# Patient Record
Sex: MALE | Race: WHITE
[De-identification: names, ages, dates, MRNs, and addresses within clinical notes are randomized per-mention and may not be internally consistent; named-entity substitution may affect disease eponyms.]

---

## 2019-06-21 ENCOUNTER — HOSPITAL ENCOUNTER (OUTPATIENT)
Dept: HOSPITAL 61 - CT | Age: 44
Discharge: HOME | End: 2019-06-21
Attending: INTERNAL MEDICINE
Payer: COMMERCIAL

## 2019-06-21 DIAGNOSIS — R91.1: ICD-10-CM

## 2019-06-21 DIAGNOSIS — K76.89: ICD-10-CM

## 2019-06-21 DIAGNOSIS — K63.89: ICD-10-CM

## 2019-06-21 DIAGNOSIS — C18.9: Primary | ICD-10-CM

## 2019-06-21 PROCEDURE — 71260 CT THORAX DX C+: CPT

## 2019-06-21 PROCEDURE — 74177 CT ABD & PELVIS W/CONTRAST: CPT

## 2019-06-21 NOTE — RAD
CT of the chest, abdomen and pelvis with contrast, 6/21/2019:

 

HISTORY: Sigmoid colon mass, colon cancer

 

Multidetector CT imaging was performed following oral and IV 

administration of contrast.

 

No pulmonary mass or consolidation is seen. There is no evidence of 

pleural fluid. No mediastinal or hilar adenopathy is evident.

 

There is a poorly marginated 2.4 cm low-density lesion in the superior 

aspect of the right lobe of the liver. A similar lesion is present 

anteriorly in the left lobe. There is a very tiny additional lesion seen 

posteriorly in the dome of the right lobe of the liver. No bile duct 

dilatation is seen. The gallbladder is unremarkable.

 

The pancreas shows no abnormality. The spleen is of normal size. No renal 

or adrenal abnormality is detected. No bladder abnormality is evident.

 

The abdominal aorta is unremarkable. No retroperitoneal or pelvic 

adenopathy is seen.

 

The oral contrast material has reached the right colon. There is moderate 

mural thickening involving the mid sigmoid colon, presumably representing 

the mass seen at colonoscopy. There is minimal streaky increased density 

in the paracolic fat. 

A cluster of small nearby mesenteric lymph nodes are seen left of midline,

both of which measure approximately 6-7 mm. No definite pathologically 

enlarged mesenteric lymph nodes are seen. No free fluid or free air is 

evident in the abdomen or pelvis.

 

IMPRESSION:

1. Moderate mid sigmoid mural thickening suggesting primary colon 

malignancy.

2. Minimal adjacent streaky mesenteric densities are compatible with 

inflammation. Two nearby mesenteric lymph nodes of borderline size may be 

reactive or metastatic in nature.

3. Several ill-defined hepatic lesions are present raising the possibility

of hepatic metastatic disease.

4. No CT evidence of metastatic disease in the chest.

 

 

PQRS Compliance Statement:

 

One or more of the following individualized dose reduction techniques were

utilized for this examination:

1. Automated exposure control

2. Adjustment of the mA and/or kV according to patient size

3. Use of iterative reconstruction technique

 

 

 

Electronically signed by: Rick Moritz, MD (6/21/2019 2:30 PM) [facility]

## 2019-07-10 ENCOUNTER — HOSPITAL ENCOUNTER (OUTPATIENT)
Dept: HOSPITAL 61 - SURGPAT | Age: 44
Discharge: HOME | End: 2019-07-10
Attending: SURGERY
Payer: COMMERCIAL

## 2019-07-10 DIAGNOSIS — Z01.818: Primary | ICD-10-CM

## 2019-07-10 DIAGNOSIS — C18.7: ICD-10-CM

## 2019-07-10 LAB
ALBUMIN SERPL-MCNC: 3.5 G/DL (ref 3.4–5)
ALBUMIN/GLOB SERPL: 0.9 {RATIO} (ref 1–1.7)
ALP SERPL-CCNC: 65 U/L (ref 46–116)
ALT SERPL-CCNC: 28 U/L (ref 16–63)
ANION GAP SERPL CALC-SCNC: 10 MMOL/L (ref 6–14)
AST SERPL-CCNC: 16 U/L (ref 15–37)
BASOPHILS # BLD AUTO: 0.1 X10^3/UL (ref 0–0.2)
BASOPHILS NFR BLD: 2 % (ref 0–3)
BILIRUB SERPL-MCNC: 0.7 MG/DL (ref 0.2–1)
BUN SERPL-MCNC: 9 MG/DL (ref 8–26)
BUN/CREAT SERPL: 9 (ref 6–20)
CALCIUM SERPL-MCNC: 9 MG/DL (ref 8.5–10.1)
CHLORIDE SERPL-SCNC: 103 MMOL/L (ref 98–107)
CO2 SERPL-SCNC: 26 MMOL/L (ref 21–32)
CREAT SERPL-MCNC: 1 MG/DL (ref 0.7–1.3)
EOSINOPHIL NFR BLD: 0.7 X10^3/UL (ref 0–0.7)
EOSINOPHIL NFR BLD: 10 % (ref 0–3)
ERYTHROCYTE [DISTWIDTH] IN BLOOD BY AUTOMATED COUNT: 14 % (ref 11.5–14.5)
GFR SERPLBLD BASED ON 1.73 SQ M-ARVRAT: 81.2 ML/MIN
GLOBULIN SER-MCNC: 4 G/DL (ref 2.2–3.8)
GLUCOSE SERPL-MCNC: 83 MG/DL (ref 70–99)
HCT VFR BLD CALC: 41.1 % (ref 39–53)
HGB BLD-MCNC: 13.5 G/DL (ref 13–17.5)
LYMPHOCYTES # BLD: 1.7 X10^3/UL (ref 1–4.8)
LYMPHOCYTES NFR BLD AUTO: 25 % (ref 24–48)
MCH RBC QN AUTO: 26 PG (ref 25–35)
MCHC RBC AUTO-ENTMCNC: 33 G/DL (ref 31–37)
MCV RBC AUTO: 79 FL (ref 79–100)
MONO #: 0.7 X10^3/UL (ref 0–1.1)
MONOCYTES NFR BLD: 9 % (ref 0–9)
NEUT #: 3.9 X10^3/UL (ref 1.8–7.7)
NEUTROPHILS NFR BLD AUTO: 55 % (ref 31–73)
PLATELET # BLD AUTO: 285 X10^3/UL (ref 140–400)
POTASSIUM SERPL-SCNC: 3.7 MMOL/L (ref 3.5–5.1)
PROT SERPL-MCNC: 7.5 G/DL (ref 6.4–8.2)
RBC # BLD AUTO: 5.18 X10^6/UL (ref 4.3–5.7)
SODIUM SERPL-SCNC: 139 MMOL/L (ref 136–145)
WBC # BLD AUTO: 7.1 X10^3/UL (ref 4–11)

## 2019-07-10 PROCEDURE — 85025 COMPLETE CBC W/AUTO DIFF WBC: CPT

## 2019-07-10 PROCEDURE — 80053 COMPREHEN METABOLIC PANEL: CPT

## 2019-07-10 PROCEDURE — 36415 COLL VENOUS BLD VENIPUNCTURE: CPT

## 2019-07-10 PROCEDURE — 93005 ELECTROCARDIOGRAM TRACING: CPT

## 2019-07-10 PROCEDURE — 82378 CARCINOEMBRYONIC ANTIGEN: CPT

## 2019-07-10 PROCEDURE — 71046 X-RAY EXAM CHEST 2 VIEWS: CPT

## 2019-07-10 NOTE — RAD
AP and Lateral Views of the Chest  7/10/2019 12:47 PM

 

Indication: Preoperative

 

Comparison: CT of the chest, abdomen and pelvis June 21, 2019

 

Findings: There is no focal consolidation or infiltrate identified. The 

cardiomediastinal silhouette is within normal limits. There is no evidence

of pneumothorax or pleural effusion. No acute osseous abnormalities are 

identified.  

 

Impression: No evidence of acute cardiopulmonary process. 

 

Electronically signed by: Harry Fernando MD (7/10/2019 4:33 PM) [id number]

## 2019-07-10 NOTE — EKG
[facility]

              [address] [place] [address]

Test Date:    2019-07-10               Test Time:    12:35:36

Pat Name:     JEREMIAH CARRELL         Department:   

Patient ID:   [id number]           Room:          

Gender:       M                        Technician:   GUZMAN

:          1975               Requested By: DOLLY CHRISTENSEN

Order Number: [id number]           Reading MD:   Richard Rodriguez MD

                                 Measurements

Intervals                              Axis          

Rate:         75                       P:            0

WV:           128                      QRS:          36

QRSD:         72                       T:            18

QT:           362                                    

QTc:          407                                    

                           Interpretive Statements

SINUS RHYTHM



Electronically Signed On 2019 12:55:57 CDT by Richard Rodriguez MD

## 2019-07-16 ENCOUNTER — HOSPITAL ENCOUNTER (INPATIENT)
Dept: HOSPITAL 61 - OPSVCIP | Age: 44
LOS: 4 days | Discharge: HOME | DRG: 330 | End: 2019-07-20
Attending: SURGERY | Admitting: SURGERY
Payer: COMMERCIAL

## 2019-07-16 VITALS — DIASTOLIC BLOOD PRESSURE: 60 MMHG | SYSTOLIC BLOOD PRESSURE: 122 MMHG

## 2019-07-16 VITALS — SYSTOLIC BLOOD PRESSURE: 116 MMHG | DIASTOLIC BLOOD PRESSURE: 65 MMHG

## 2019-07-16 VITALS — SYSTOLIC BLOOD PRESSURE: 123 MMHG | DIASTOLIC BLOOD PRESSURE: 75 MMHG

## 2019-07-16 VITALS — SYSTOLIC BLOOD PRESSURE: 132 MMHG | DIASTOLIC BLOOD PRESSURE: 73 MMHG

## 2019-07-16 VITALS — DIASTOLIC BLOOD PRESSURE: 80 MMHG | SYSTOLIC BLOOD PRESSURE: 134 MMHG

## 2019-07-16 VITALS — WEIGHT: 238.1 LBS | HEIGHT: 75 IN | BODY MASS INDEX: 29.6 KG/M2

## 2019-07-16 VITALS — SYSTOLIC BLOOD PRESSURE: 130 MMHG | DIASTOLIC BLOOD PRESSURE: 60 MMHG

## 2019-07-16 VITALS — DIASTOLIC BLOOD PRESSURE: 66 MMHG | SYSTOLIC BLOOD PRESSURE: 117 MMHG

## 2019-07-16 VITALS — SYSTOLIC BLOOD PRESSURE: 128 MMHG | DIASTOLIC BLOOD PRESSURE: 73 MMHG

## 2019-07-16 VITALS — DIASTOLIC BLOOD PRESSURE: 75 MMHG | SYSTOLIC BLOOD PRESSURE: 124 MMHG

## 2019-07-16 VITALS — DIASTOLIC BLOOD PRESSURE: 62 MMHG | SYSTOLIC BLOOD PRESSURE: 117 MMHG

## 2019-07-16 VITALS — SYSTOLIC BLOOD PRESSURE: 121 MMHG | DIASTOLIC BLOOD PRESSURE: 46 MMHG

## 2019-07-16 DIAGNOSIS — Z82.49: ICD-10-CM

## 2019-07-16 DIAGNOSIS — E87.6: ICD-10-CM

## 2019-07-16 DIAGNOSIS — Z79.899: ICD-10-CM

## 2019-07-16 DIAGNOSIS — C18.7: Primary | ICD-10-CM

## 2019-07-16 DIAGNOSIS — C78.7: ICD-10-CM

## 2019-07-16 DIAGNOSIS — Z85.038: ICD-10-CM

## 2019-07-16 PROCEDURE — 88309 TISSUE EXAM BY PATHOLOGIST: CPT

## 2019-07-16 PROCEDURE — C2617 STENT, NON-COR, TEM W/O DEL: HCPCS

## 2019-07-16 PROCEDURE — A4461 SURGICL DRESS HOLD NON-REUSE: HCPCS

## 2019-07-16 PROCEDURE — 36415 COLL VENOUS BLD VENIPUNCTURE: CPT

## 2019-07-16 PROCEDURE — 88331 PATH CONSLTJ SURG 1 BLK 1SPC: CPT

## 2019-07-16 PROCEDURE — 0T788ZZ DILATION OF BILATERAL URETERS, VIA NATURAL OR ARTIFICIAL OPENING ENDOSCOPIC: ICD-10-PCS | Performed by: UROLOGY

## 2019-07-16 PROCEDURE — 74018 RADEX ABDOMEN 1 VIEW: CPT

## 2019-07-16 PROCEDURE — A7015 AEROSOL MASK USED W NEBULIZE: HCPCS

## 2019-07-16 PROCEDURE — 88305 TISSUE EXAM BY PATHOLOGIST: CPT

## 2019-07-16 PROCEDURE — 0DTN4ZZ RESECTION OF SIGMOID COLON, PERCUTANEOUS ENDOSCOPIC APPROACH: ICD-10-PCS | Performed by: SURGERY

## 2019-07-16 PROCEDURE — 85025 COMPLETE CBC W/AUTO DIFF WBC: CPT

## 2019-07-16 PROCEDURE — 80048 BASIC METABOLIC PNL TOTAL CA: CPT

## 2019-07-16 PROCEDURE — 0FB24ZX EXCISION OF LEFT LOBE LIVER, PERCUTANEOUS ENDOSCOPIC APPROACH, DIAGNOSTIC: ICD-10-PCS | Performed by: SURGERY

## 2019-07-16 PROCEDURE — C1769 GUIDE WIRE: HCPCS

## 2019-07-16 PROCEDURE — 0DJD8ZZ INSPECTION OF LOWER INTESTINAL TRACT, VIA NATURAL OR ARTIFICIAL OPENING ENDOSCOPIC: ICD-10-PCS | Performed by: SURGERY

## 2019-07-16 RX ADMIN — SODIUM CHLORIDE, SODIUM LACTATE, POTASSIUM CHLORIDE, AND CALCIUM CHLORIDE SCH MLS/HR: .6; .31; .03; .02 INJECTION, SOLUTION INTRAVENOUS at 12:30

## 2019-07-16 RX ADMIN — CEFOXITIN SCH GM: 2 INJECTION, POWDER, FOR SOLUTION INTRAVENOUS at 08:32

## 2019-07-16 RX ADMIN — SODIUM CHLORIDE, SODIUM LACTATE, POTASSIUM CHLORIDE, AND CALCIUM CHLORIDE SCH MLS/HR: .6; .31; .03; .02 INJECTION, SOLUTION INTRAVENOUS at 11:33

## 2019-07-16 RX ADMIN — SODIUM CHLORIDE, SODIUM LACTATE, POTASSIUM CHLORIDE, AND CALCIUM CHLORIDE SCH MLS/HR: .6; .31; .03; .02 INJECTION, SOLUTION INTRAVENOUS at 07:11

## 2019-07-16 RX ADMIN — CEFOXITIN SCH GM: 2 INJECTION, POWDER, FOR SOLUTION INTRAVENOUS at 08:30

## 2019-07-16 RX ADMIN — MORPHINE SULFATE PRN MLS/HR: 1 INJECTION INTRAVENOUS at 11:58

## 2019-07-16 RX ADMIN — SODIUM CHLORIDE, SODIUM LACTATE, POTASSIUM CHLORIDE, AND CALCIUM CHLORIDE SCH MLS/HR: .6; .31; .03; .02 INJECTION, SOLUTION INTRAVENOUS at 18:22

## 2019-07-16 RX ADMIN — ENOXAPARIN SODIUM SCH MG: 40 INJECTION SUBCUTANEOUS at 22:12

## 2019-07-16 NOTE — PDOC
SURGICAL PROGRESS NOTE


Subjective


43 yo M with sigmoid colon cancer (diagnosed by colonoscopy and biopsy).  Also 

noted to have masses in liver by CT, negative chest.


TO OR for laparoscopic versus open sigmoid resection, liver biopsy


R/R/B/A d/w pt and pt's family.  Risks, including, but not limited to:  

bleeding, infection, damage to surrounding structures, risk of anesthesia, risk 

of open, risk of anastomotic leak, risk of death.


They appear to understand, their questions are answered and they elect to 

proceed.


Office note H&P reviewed and unchanged.  CEA level 0.9 (seems unlikely to have 

metastatic spread to liver).


Vital Signs





Vital Signs








  Date Time  Temp Pulse Resp B/P (MAP) Pulse Ox O2 Delivery O2 Flow Rate FiO2


 


7/16/19 06:55 98 95 18 125/77 97 Room Air  





 98.0       

















DOLLY CHRISTENSEN MD             Jul 16, 2019 07:50

## 2019-07-16 NOTE — RAD
Indication: Postop sigmoid surgery

 

TECHNIQUE: Single supine AP view of the abdomen and pelvis

 

COMPARISON: None

 

FINDINGS:

The colon is distended with gas. No abnormally dilated small bowel loops 

noted. Visualized bones are within normal limits. No radiopaque foreign 

body. Ill-defined lucencies are seen in the pelvis.

 

IMPRESSION:

Gas-filled colon.

Ill-defined lucencies in the pelvis may represent emphysema if there is 

history of recent surgery.

 

Electronically signed by: Carlos Henriquez DO (7/16/2019 11:38 AM) [facility]

## 2019-07-16 NOTE — PDOC4
OPERATIVE NOTE


Date:


Date:  Jul 16, 2019





Pre-Op Diagnosis:


Colon cancer of the sigmoid colon





Post-Op Diagnosis:


same, metastasis to liver





Procedure Performed:


laparoscopic sigmoid colectomy, liver biopsy





Surgeon:


Odin Christensen





Anesthesia Type:


GETA plus local





Blood Loss:


50





Specimans Obtained:


sigmoid colon, liver biopsy





Findings:


left lobe of the liver with whitish mass, c/w metastatic adenocarcinoma by 

frozen section, moderate size mass in low sigmoid colon, no obvious metastatic 

disease anywhere else





Complications:


none





Operative Note:


After obtaining informed consent, patient was taken to OR, induced under GETA 

and prepped in the usual fashion in a low lithotomy position.  Ureteral stents 

placed prior to.  5 mm ports placed RLQ, suprapubic and epigastric, all under 

laparoscopic guidance.  Abdominal cavity explored.  Right, transverse colon 

normal in appearance.  Gallbladder grossly normal.  Right liver with obvious 

mass.  Left lobe of liver with 2 cm whitish mass.  This was biopsy using 

ligasure device.  Minimal bleeding.  Pathology demonstrated this to be c/w 

adenocarcinoma by frozen.  No other peritoneal pathology identified.  Small 

bowel normal in appearance.





Attention turned to sigmoid colon.  White line of Toldt divided with ligasure up

to splenic flexure.  Sigmoid colon fully mobilized.  Left ureter identified by 

palpation of stent and maintained without injury.  Mass noted in low sigmoid 

colon, superior to peritoneal reflexion.  Tattoo noted proximal and distal.  

Transverse incision made in LLQ and wound retractor placed with evisceration of 

sigmoid colon.  Colon divided proximal and distal to area using ELI stapler.  

Mesentery taken down to origin of vessels using ligasure.  Sigmoid vessels 

ligated with 3 0 vicryl stick tie and ligasure.  Specimen sent to pathology with

stitch in distal end.  Pathology demonstrated mass with good margins.





End to end sutured anastomosis created with 3 0 PDS inside and 3 0 vicryl 

outside.  Anastomosis noted to be patent, under no tension, completely viable 

and without evidence of leakage.  Bowel returned to abdominal cavity.  Copious 

irrigation.  No evidence of bleeding or other pathology.  Peritoneum closed with

0 vicryl.  Anterior fascia closed with 0 looped PDS.  Pneumoperitoneum 

reestablished and abdomen explored.  Copious irrigation.  No evidence of 

bleeding or other pathology.  Rigid sigmoidoscopy performed and no air bubbles 

detected from anastomosis and air passed readily proximal to area of concern.  

Ports removed without bleeding.  Skin repaired with 3 0 vicryl and 4 0 monocryl.

 Dressing placed.





Patient tolerated procedure well and sent to PACU in stable condition.  All 

counts correct.  Wound class is 3.











DOLLY CHRISTENSEN MD             Jul 16, 2019 11:52

## 2019-07-16 NOTE — PDOC4
OPERATIVE NOTE


Date:


Date:  Jul 16, 2019





Post-Op Diagnosis:


nl bladder;  hx colon ca





Procedure Performed:


cysto, pl ureteral caths'





Surgeon:


Kyung





Anesthesia Type:


Gen





Blood Loss:


min





Specimans Obtained:


neg





Findings:


nl bladder





Complications:


neg





Operative Note:


Under GA and in lith'y pos'n, IV abx admin'd





Cysto with 21 Scope was unrem.  UO's in nl pos'n.  5-Fr whstle tipped catheters 

were then threaded thru each ureter without diff.   The scope was removed prema  

tenorio was placed.  The ureteral cath's were then connected to the tenorio in usual

fashion.  A tenorio bag was the attached.   There were no complications.











NATI ESTES MD              Jul 16, 2019 08:44

## 2019-07-17 VITALS — DIASTOLIC BLOOD PRESSURE: 80 MMHG | SYSTOLIC BLOOD PRESSURE: 158 MMHG

## 2019-07-17 VITALS — SYSTOLIC BLOOD PRESSURE: 108 MMHG | DIASTOLIC BLOOD PRESSURE: 66 MMHG

## 2019-07-17 VITALS — DIASTOLIC BLOOD PRESSURE: 61 MMHG | SYSTOLIC BLOOD PRESSURE: 116 MMHG

## 2019-07-17 VITALS — SYSTOLIC BLOOD PRESSURE: 146 MMHG | DIASTOLIC BLOOD PRESSURE: 100 MMHG

## 2019-07-17 VITALS — SYSTOLIC BLOOD PRESSURE: 139 MMHG | DIASTOLIC BLOOD PRESSURE: 77 MMHG

## 2019-07-17 VITALS — DIASTOLIC BLOOD PRESSURE: 76 MMHG | SYSTOLIC BLOOD PRESSURE: 135 MMHG

## 2019-07-17 LAB
ANION GAP SERPL CALC-SCNC: 8 MMOL/L (ref 6–14)
BASOPHILS # BLD AUTO: 0 X10^3/UL (ref 0–0.2)
BASOPHILS NFR BLD: 1 % (ref 0–3)
BUN SERPL-MCNC: 8 MG/DL (ref 8–26)
CALCIUM SERPL-MCNC: 8.6 MG/DL (ref 8.5–10.1)
CHLORIDE SERPL-SCNC: 105 MMOL/L (ref 98–107)
CO2 SERPL-SCNC: 29 MMOL/L (ref 21–32)
CREAT SERPL-MCNC: 1 MG/DL (ref 0.7–1.3)
EOSINOPHIL NFR BLD: 0 % (ref 0–3)
EOSINOPHIL NFR BLD: 0 X10^3/UL (ref 0–0.7)
ERYTHROCYTE [DISTWIDTH] IN BLOOD BY AUTOMATED COUNT: 14.4 % (ref 11.5–14.5)
GFR SERPLBLD BASED ON 1.73 SQ M-ARVRAT: 81.2 ML/MIN
GLUCOSE SERPL-MCNC: 99 MG/DL (ref 70–99)
HCT VFR BLD CALC: 36.2 % (ref 39–53)
HGB BLD-MCNC: 12.1 G/DL (ref 13–17.5)
LYMPHOCYTES # BLD: 1.7 X10^3/UL (ref 1–4.8)
LYMPHOCYTES NFR BLD AUTO: 17 % (ref 24–48)
MCH RBC QN AUTO: 27 PG (ref 25–35)
MCHC RBC AUTO-ENTMCNC: 34 G/DL (ref 31–37)
MCV RBC AUTO: 79 FL (ref 79–100)
MONO #: 1.1 X10^3/UL (ref 0–1.1)
MONOCYTES NFR BLD: 11 % (ref 0–9)
NEUT #: 6.9 X10^3/UL (ref 1.8–7.7)
NEUTROPHILS NFR BLD AUTO: 71 % (ref 31–73)
PLATELET # BLD AUTO: 240 X10^3/UL (ref 140–400)
POTASSIUM SERPL-SCNC: 3.9 MMOL/L (ref 3.5–5.1)
RBC # BLD AUTO: 4.58 X10^6/UL (ref 4.3–5.7)
SODIUM SERPL-SCNC: 142 MMOL/L (ref 136–145)
WBC # BLD AUTO: 9.7 X10^3/UL (ref 4–11)

## 2019-07-17 RX ADMIN — MORPHINE SULFATE PRN MLS/HR: 1 INJECTION INTRAVENOUS at 07:04

## 2019-07-17 RX ADMIN — MORPHINE SULFATE PRN MLS/HR: 1 INJECTION INTRAVENOUS at 21:04

## 2019-07-17 RX ADMIN — SODIUM CHLORIDE, SODIUM LACTATE, POTASSIUM CHLORIDE, AND CALCIUM CHLORIDE SCH MLS/HR: .6; .31; .03; .02 INJECTION, SOLUTION INTRAVENOUS at 09:55

## 2019-07-17 RX ADMIN — SODIUM CHLORIDE, SODIUM LACTATE, POTASSIUM CHLORIDE, AND CALCIUM CHLORIDE SCH MLS/HR: .6; .31; .03; .02 INJECTION, SOLUTION INTRAVENOUS at 17:28

## 2019-07-17 RX ADMIN — ENOXAPARIN SODIUM SCH MG: 40 INJECTION SUBCUTANEOUS at 22:19

## 2019-07-17 NOTE — PDOC
SURGICAL PROGRESS NOTE


Subjective


Pt doing well, has been ambulating, no N/V, passing some flatus


Vital Signs





Vital Signs








  Date Time  Temp Pulse Resp B/P (MAP) Pulse Ox O2 Delivery O2 Flow Rate FiO2


 


7/17/19 15:00 97.8 115 18 146/100 (115) 94 Room Air  





 97.8       


 


7/17/19 08:00       2.0 








I&O











Intake and Output 


 


 7/17/19





 07:00


 


Intake Total 4200 ml


 


Output Total 730 ml


 


Balance 3470 ml


 


 


 


Intake IV Total 3700 ml


 


Other 500 ml


 


Output Urine Total 680 ml


 


Estimated Blood Loss 50 ml








PATIENT HAS A FERREIRA:  Yes


General:  Alert, Oriented X3, Cooperative, No acute distress


Abdomen:  Soft, No tenderness


Labs





Laboratory Tests








Test


 7/17/19


07:40


 


White Blood Count


 9.7 x10^3/uL


(4.0-11.0)


 


Red Blood Count


 4.58 x10^6/uL


(4.30-5.70)


 


Hemoglobin


 12.1 g/dL


(13.0-17.5)


 


Hematocrit


 36.2 %


(39.0-53.0)


 


Mean Corpuscular Volume 79 fL () 


 


Mean Corpuscular Hemoglobin 27 pg (25-35) 


 


Mean Corpuscular Hemoglobin


Concent 34 g/dL


(31-37)


 


Red Cell Distribution Width


 14.4 %


(11.5-14.5)


 


Platelet Count


 240 x10^3/uL


(140-400)


 


Neutrophils (%) (Auto) 71 % (31-73) 


 


Lymphocytes (%) (Auto) 17 % (24-48) 


 


Monocytes (%) (Auto) 11 % (0-9) 


 


Eosinophils (%) (Auto) 0 % (0-3) 


 


Basophils (%) (Auto) 1 % (0-3) 


 


Neutrophils # (Auto)


 6.9 x10^3/uL


(1.8-7.7)


 


Lymphocytes # (Auto)


 1.7 x10^3/uL


(1.0-4.8)


 


Monocytes # (Auto)


 1.1 x10^3/uL


(0.0-1.1)


 


Eosinophils # (Auto)


 0.0 x10^3/uL


(0.0-0.7)


 


Basophils # (Auto)


 0.0 x10^3/uL


(0.0-0.2)


 


Sodium Level


 142 mmol/L


(136-145)


 


Potassium Level


 3.9 mmol/L


(3.5-5.1)


 


Chloride Level


 105 mmol/L


()


 


Carbon Dioxide Level


 29 mmol/L


(21-32)


 


Anion Gap 8 (6-14) 


 


Blood Urea Nitrogen 8 mg/dL (8-26) 


 


Creatinine


 1.0 mg/dL


(0.7-1.3)


 


Estimated GFR


(Cockcroft-Gault) 81.2 





 


Glucose Level


 99 mg/dL


(70-99)


 


Calcium Level


 8.6 mg/dL


(8.5-10.1)








Laboratory Tests








Test


 7/17/19


07:40


 


White Blood Count


 9.7 x10^3/uL


(4.0-11.0)


 


Red Blood Count


 4.58 x10^6/uL


(4.30-5.70)


 


Hemoglobin


 12.1 g/dL


(13.0-17.5)


 


Hematocrit


 36.2 %


(39.0-53.0)


 


Mean Corpuscular Volume 79 fL () 


 


Mean Corpuscular Hemoglobin 27 pg (25-35) 


 


Mean Corpuscular Hemoglobin


Concent 34 g/dL


(31-37)


 


Red Cell Distribution Width


 14.4 %


(11.5-14.5)


 


Platelet Count


 240 x10^3/uL


(140-400)


 


Neutrophils (%) (Auto) 71 % (31-73) 


 


Lymphocytes (%) (Auto) 17 % (24-48) 


 


Monocytes (%) (Auto) 11 % (0-9) 


 


Eosinophils (%) (Auto) 0 % (0-3) 


 


Basophils (%) (Auto) 1 % (0-3) 


 


Neutrophils # (Auto)


 6.9 x10^3/uL


(1.8-7.7)


 


Lymphocytes # (Auto)


 1.7 x10^3/uL


(1.0-4.8)


 


Monocytes # (Auto)


 1.1 x10^3/uL


(0.0-1.1)


 


Eosinophils # (Auto)


 0.0 x10^3/uL


(0.0-0.7)


 


Basophils # (Auto)


 0.0 x10^3/uL


(0.0-0.2)


 


Sodium Level


 142 mmol/L


(136-145)


 


Potassium Level


 3.9 mmol/L


(3.5-5.1)


 


Chloride Level


 105 mmol/L


()


 


Carbon Dioxide Level


 29 mmol/L


(21-32)


 


Anion Gap 8 (6-14) 


 


Blood Urea Nitrogen 8 mg/dL (8-26) 


 


Creatinine


 1.0 mg/dL


(0.7-1.3)


 


Estimated GFR


(Cockcroft-Gault) 81.2 





 


Glucose Level


 99 mg/dL


(70-99)


 


Calcium Level


 8.6 mg/dL


(8.5-10.1)








Problem List


s/p l/s left colon


monitor tachycardia, will ask hospitalist to evaluate HTN


suspect stress response from surgery


consider kayley in AM











DOLLY CHRISTENSEN MD             Jul 17, 2019 18:19

## 2019-07-18 VITALS — DIASTOLIC BLOOD PRESSURE: 69 MMHG | SYSTOLIC BLOOD PRESSURE: 124 MMHG

## 2019-07-18 VITALS — SYSTOLIC BLOOD PRESSURE: 141 MMHG | DIASTOLIC BLOOD PRESSURE: 78 MMHG

## 2019-07-18 VITALS — DIASTOLIC BLOOD PRESSURE: 79 MMHG | SYSTOLIC BLOOD PRESSURE: 130 MMHG

## 2019-07-18 VITALS — DIASTOLIC BLOOD PRESSURE: 67 MMHG | SYSTOLIC BLOOD PRESSURE: 139 MMHG

## 2019-07-18 VITALS — DIASTOLIC BLOOD PRESSURE: 77 MMHG | SYSTOLIC BLOOD PRESSURE: 132 MMHG

## 2019-07-18 VITALS — DIASTOLIC BLOOD PRESSURE: 75 MMHG | SYSTOLIC BLOOD PRESSURE: 124 MMHG

## 2019-07-18 RX ADMIN — SODIUM CHLORIDE, SODIUM LACTATE, POTASSIUM CHLORIDE, AND CALCIUM CHLORIDE SCH MLS/HR: .6; .31; .03; .02 INJECTION, SOLUTION INTRAVENOUS at 23:54

## 2019-07-18 RX ADMIN — MORPHINE SULFATE PRN MLS/HR: 1 INJECTION INTRAVENOUS at 14:56

## 2019-07-18 RX ADMIN — ENOXAPARIN SODIUM SCH MG: 40 INJECTION SUBCUTANEOUS at 22:27

## 2019-07-18 RX ADMIN — SODIUM CHLORIDE, SODIUM LACTATE, POTASSIUM CHLORIDE, AND CALCIUM CHLORIDE SCH MLS/HR: .6; .31; .03; .02 INJECTION, SOLUTION INTRAVENOUS at 16:33

## 2019-07-18 RX ADMIN — SODIUM CHLORIDE, SODIUM LACTATE, POTASSIUM CHLORIDE, AND CALCIUM CHLORIDE SCH MLS/HR: .6; .31; .03; .02 INJECTION, SOLUTION INTRAVENOUS at 07:56

## 2019-07-18 NOTE — PDOC
SURGICAL PROGRESS NOTE


Subjective


+ flatus


pain managed


up in chair


Vital Signs





Vital Signs








  Date Time  Temp Pulse Resp B/P (MAP) Pulse Ox O2 Delivery O2 Flow Rate FiO2


 


7/18/19 08:00      Room Air  


 


7/18/19 07:00 98.1 109 16 130/79 (96) 97   





 98.1       


 


7/17/19 08:00       2.0 








I&O











Intake and Output 


 


 7/18/19





 06:59


 


Intake Total 1500 ml


 


Output Total 3625 ml


 


Balance -2125 ml


 


 


 


Intake IV Total 1500 ml


 


Output Urine Total 3625 ml








General:  Alert, Oriented X3, Cooperative, No acute distress


Abdomen:  Soft, Other (incisions c/d/i, no erythema )


Labs





Laboratory Tests








Test


 7/17/19


07:40


 


White Blood Count


 9.7 x10^3/uL


(4.0-11.0)


 


Red Blood Count


 4.58 x10^6/uL


(4.30-5.70)


 


Hemoglobin


 12.1 g/dL


(13.0-17.5)


 


Hematocrit


 36.2 %


(39.0-53.0)


 


Mean Corpuscular Volume 79 fL () 


 


Mean Corpuscular Hemoglobin 27 pg (25-35) 


 


Mean Corpuscular Hemoglobin


Concent 34 g/dL


(31-37)


 


Red Cell Distribution Width


 14.4 %


(11.5-14.5)


 


Platelet Count


 240 x10^3/uL


(140-400)


 


Neutrophils (%) (Auto) 71 % (31-73) 


 


Lymphocytes (%) (Auto) 17 % (24-48) 


 


Monocytes (%) (Auto) 11 % (0-9) 


 


Eosinophils (%) (Auto) 0 % (0-3) 


 


Basophils (%) (Auto) 1 % (0-3) 


 


Neutrophils # (Auto)


 6.9 x10^3/uL


(1.8-7.7)


 


Lymphocytes # (Auto)


 1.7 x10^3/uL


(1.0-4.8)


 


Monocytes # (Auto)


 1.1 x10^3/uL


(0.0-1.1)


 


Eosinophils # (Auto)


 0.0 x10^3/uL


(0.0-0.7)


 


Basophils # (Auto)


 0.0 x10^3/uL


(0.0-0.2)


 


Sodium Level


 142 mmol/L


(136-145)


 


Potassium Level


 3.9 mmol/L


(3.5-5.1)


 


Chloride Level


 105 mmol/L


()


 


Carbon Dioxide Level


 29 mmol/L


(21-32)


 


Anion Gap 8 (6-14) 


 


Blood Urea Nitrogen 8 mg/dL (8-26) 


 


Creatinine


 1.0 mg/dL


(0.7-1.3)


 


Estimated GFR


(Cockcroft-Gault) 81.2 





 


Glucose Level


 99 mg/dL


(70-99)


 


Calcium Level


 8.6 mg/dL


(8.5-10.1)








Assessment/Plan


s/p resection


tachy improving


start MONTSERRAT Joya APRN            Jul 18, 2019 10:48

## 2019-07-18 NOTE — CONS
DATE OF CONSULTATION:  07/18/2019



CHIEF COMPLAINT:  Colon cancer status post sigmoid colon resection and

cystoscopy with ureteral stents.  Request for medical evaluation and treatment

of comorbidities.



HISTORY OF PRESENT ILLNESS:  The patient is a pleasant middle-aged male who was

diagnosed with colon cancer recently by colonoscopy with biopsies.  Yesterday,

he underwent sigmoid resection laparoscopically with liver biopsies and he also

had Urology do a cystoscopy with ureteral stent placements at the same time.  We

have been requested for postop medical evaluation and treatment of

comorbidities.



PAST MEDICAL HISTORY:  Constipation.



ALLERGIES:  None.



FAMILY HISTORY:  Hypertension.



SOCIAL HISTORY:  Does not drink, smoke or take drugs.



MEDICATIONS:  Reviewed.  He is on docusate and Metamucil.



REVIEW OF SYSTEMS:

GENERAL:  No history of weight change, weakness or fevers.

SKIN:  No bruising, hair changes or rashes.

EYES:  No blurred, double or loss of vision.

NOSE AND THROAT:  No history of nosebleeds, hoarseness or sore throat.

HEART:  No history of palpitations, chest pain or shortness of breath on

exertion.

LUNGS:  Denies cough, hemoptysis, wheezing or shortness of breath.

GASTROINTESTINAL:  Denies changes in appetite, nausea, vomiting, diarrhea or

constipation.

GENITOURINARY:  No history of frequency, urgency, hesitancy or nocturia.

NEUROLOGIC:  Denies history of numbness, tingling, tremor or weakness.

PSYCHIATRIC:  No history of panic, anxiety or depression.

ENDOCRINE:  No history of heat or cold intolerance, polyuria or polydipsia.

EXTREMITIES:  Denies muscle weakness, joint pain, pain on walking or stiffness.



PHYSICAL EXAMINATION:

VITALS:  Within normal limits and are stable.

GENERAL:  No apparent distress.  Alert and oriented.

HEENT:  Head is normocephalic, atraumatic, pupils were equally round and

reactive to light and accommodation.

NECK:  Supple, no JVD, no thyromegaly was noted.

LUNGS:  Clear to auscultation in all lung fields without rhonchi or wheezing.

HEART:  RRR, S1, S2 present.  Peripheral pulses intact, no obvious murmurs were

noted.

ABDOMEN:  Soft, nontender.  Positive bowel sounds no organomegaly, normal bowel

sounds.

EXTREMITIES:  Without any cyanosis, clubbing, or edema.  Pedal pulses intact,

Homans sign is negative.

NEUROLOGIC:  Normal speech, normal tone.  A and O x3, moves all extremities, no

obvious focal deficits.

PSYCHIATRIC:  Normal affect, normal mood.  Stable.

SKIN:  No ulcerations or rashes, good skin turgor, no jaundice.

VASCULAR:  Good capillary refill, neurovascular bundle appears to be intact.

LABORATORY DATA:  Hemoglobin is 12.1.  Electrolytes are normal.



ASSESSMENT AND PLAN:  Colon cancer status post sigmoid colon resection and

status post cystoscopy with ureteral stent placement.  The patient clinically

seems to be doing okay.  We will check a fresh set of labs in the morning. 

Aggressive wound care, PT, OT, home meds, p.r.n. narcotics and currently is on a

morphine pump and p.r.n. Zofran.



Thank you very much for allowing us to participate in the care of this nice

gentleman.

 



______________________________

CONNIE COPELAND DO



DR:  ALEJANDRO/micaela  JOB#:  [id number] / [id number]

DD:  07/18/2019 12:49  DT:  07/18/2019 20:54

## 2019-07-19 VITALS — SYSTOLIC BLOOD PRESSURE: 143 MMHG | DIASTOLIC BLOOD PRESSURE: 67 MMHG

## 2019-07-19 VITALS — DIASTOLIC BLOOD PRESSURE: 67 MMHG | SYSTOLIC BLOOD PRESSURE: 123 MMHG

## 2019-07-19 VITALS — SYSTOLIC BLOOD PRESSURE: 123 MMHG | DIASTOLIC BLOOD PRESSURE: 62 MMHG

## 2019-07-19 VITALS — SYSTOLIC BLOOD PRESSURE: 139 MMHG | DIASTOLIC BLOOD PRESSURE: 79 MMHG

## 2019-07-19 VITALS — SYSTOLIC BLOOD PRESSURE: 140 MMHG | DIASTOLIC BLOOD PRESSURE: 71 MMHG

## 2019-07-19 VITALS — DIASTOLIC BLOOD PRESSURE: 75 MMHG | SYSTOLIC BLOOD PRESSURE: 131 MMHG

## 2019-07-19 LAB
ANION GAP SERPL CALC-SCNC: 8 MMOL/L (ref 6–14)
BASOPHILS # BLD AUTO: 0.1 X10^3/UL (ref 0–0.2)
BASOPHILS NFR BLD: 1 % (ref 0–3)
BUN SERPL-MCNC: 7 MG/DL (ref 8–26)
CALCIUM SERPL-MCNC: 8.6 MG/DL (ref 8.5–10.1)
CHLORIDE SERPL-SCNC: 102 MMOL/L (ref 98–107)
CO2 SERPL-SCNC: 29 MMOL/L (ref 21–32)
CREAT SERPL-MCNC: 0.9 MG/DL (ref 0.7–1.3)
EOSINOPHIL NFR BLD: 0.1 X10^3/UL (ref 0–0.7)
EOSINOPHIL NFR BLD: 2 % (ref 0–3)
ERYTHROCYTE [DISTWIDTH] IN BLOOD BY AUTOMATED COUNT: 14.1 % (ref 11.5–14.5)
GFR SERPLBLD BASED ON 1.73 SQ M-ARVRAT: 91.7 ML/MIN
GLUCOSE SERPL-MCNC: 96 MG/DL (ref 70–99)
HCT VFR BLD CALC: 35 % (ref 39–53)
HGB BLD-MCNC: 11.5 G/DL (ref 13–17.5)
LYMPHOCYTES # BLD: 1.3 X10^3/UL (ref 1–4.8)
LYMPHOCYTES NFR BLD AUTO: 22 % (ref 24–48)
MCH RBC QN AUTO: 26 PG (ref 25–35)
MCHC RBC AUTO-ENTMCNC: 33 G/DL (ref 31–37)
MCV RBC AUTO: 79 FL (ref 79–100)
MONO #: 0.7 X10^3/UL (ref 0–1.1)
MONOCYTES NFR BLD: 13 % (ref 0–9)
NEUT #: 3.6 X10^3/UL (ref 1.8–7.7)
NEUTROPHILS NFR BLD AUTO: 62 % (ref 31–73)
PLATELET # BLD AUTO: 237 X10^3/UL (ref 140–400)
POTASSIUM SERPL-SCNC: 3.4 MMOL/L (ref 3.5–5.1)
RBC # BLD AUTO: 4.4 X10^6/UL (ref 4.3–5.7)
SODIUM SERPL-SCNC: 139 MMOL/L (ref 136–145)
WBC # BLD AUTO: 5.8 X10^3/UL (ref 4–11)

## 2019-07-19 RX ADMIN — OXYCODONE HYDROCHLORIDE AND ACETAMINOPHEN PRN TAB: 5; 325 TABLET ORAL at 17:18

## 2019-07-19 RX ADMIN — ENOXAPARIN SODIUM SCH MG: 40 INJECTION SUBCUTANEOUS at 21:21

## 2019-07-19 RX ADMIN — OXYCODONE HYDROCHLORIDE AND ACETAMINOPHEN PRN TAB: 5; 325 TABLET ORAL at 21:18

## 2019-07-19 RX ADMIN — SODIUM CHLORIDE, SODIUM LACTATE, POTASSIUM CHLORIDE, AND CALCIUM CHLORIDE SCH MLS/HR: .6; .31; .03; .02 INJECTION, SOLUTION INTRAVENOUS at 06:19

## 2019-07-19 NOTE — NUR
SS following up with discharge planning. PT assessed pt and recommended home independent. No 
discharge needs noted at this time. SS will continue to follow for discharge planning.

## 2019-07-19 NOTE — PATHOLOGY
[facility] Accession Number: [id number]

.                                                                01

Material submitted:                                        .

PART A: liver - LIVER BIOPSY - FS

PART B: colon - SIGMOID, STITCH DISTAL END. Modifiers: sigmoid, distal

.                                                                01

Clinical history:                                          .

Colon CA

.                                                                02

Frozen section diagnosis:                                       .

INTRAOPERATIVE CONSULTATION WITH FROZEN SECTION:

(Ajit Clark MD)

.

A. Liver biopsy:

- METASTATIC ADENOCARCINOMA.

.

The results are reported to Dr. Levine in the operating room.

.

INTRAOPERATIVE CONSULTATION WITH GROSS IMPRESSION:

.

B. Segment of colon and attached mesocolon, sigmoid colon resection:

- Ulcerated carcinoma of colon measuring up to 6.0 cm in greatest

dimension.

- Margins of excision negative for tumor.

.

The results are displayed to Dr. Levine in the operating room.

.

Frozen section and intraoperative gross consult performed at [facility], [address] [place] [address].

.

FROZEN SECTION GROSS DESCRIPTION:

.

A. The specimen is received fresh for intraoperative consultation and is

designated "liver biopsy".  This consists of three segments of tan and

yellow-brown soft tissue, ranging from 0.2 cm up to 0.9 cm in greatest

dimension.  All are submitted for frozen section as FSA1.  The tissue

remaining from frozen section is submitted for permanent sections as A1.

.

INTRAOPERATIVE GROSS CONSULTATION DESCRIPTION:

.

B. The specimen is received fresh for intraoperative gross consultation

and is designated "sigmoid stitch distal end".  This consists of a segment

of colon with attached mesocolon.  The segment is stapled closed at both

ends.  There is a stitch attached to the distal end.  The segment measures

25 cm in length.  The attached mesocolon measures up to 7.5 cm in depth.

The antimesocolic serosa is pink to reddish brown and erythematous.  There

is an area of tattooing of the serosal surface of the midportion.  There

is also an area of tattooing of the serosa of the distal aspect of the

colon adjacent to the distal staple line.  The segment is opened along the

anti-mesocolic aspect.  Arising approximately 2.5 cm from the distal margin

of resection, there is a nearly circumferential, centrally ulcerated,

pink-tan to pinkish brown tumor mass having raised rolled borders.  The

tumor measures up to 6.0 cm in length and 6.0 cm in width.  The tumor is

fixed to the muscular wall.  There is a small tan polyp adjacent to the

distal margin measuring 0.3 cm.   The remainder of the colonic mucosa is

pink-tan to yellowish gray and transversely folded.  There are no

additional polyps or tumor masses.  There are no grossly apparent tumor

involved lymph nodes within the attached mesocolon.

(JPM:antonia; 07/16/2019)

CHELO/ONOFRE

.                                                                02

**********************************************************************

Diagnosis:

A. Liver biopsy:

- METASTATIC ADENOCARCINOMA.

.

B. Sigmoid colon and attached mesocolon, sigmoid colon resection:

- INVASIVE COLORECTAL ADENOCARCINOMA, MODERATELY DIFFERENTIATED, FORMING A

NEARLY CIRCUMFERENTIAL, CENTRALLY ULCERATED TUMOR MASS MEASURING UP TO 6.0

CM IN GREATEST DIMENSION, WITH TUMOR INVASION THROUGH MUSCULARIS PROPRIA

INTO SUBSEROSAL AND PERICOLIC SOFT TISSUES.

- Twenty-two mesocolic lymph nodes negative for tumor.

- Proximal, distal, and mesocolic margins of resection negative for tumor.

- Focal tattooing of serosal surface of mid and distal colon segment.

- Small hyperplastic polyp.

.

(JPM:pit 07/18/2019)

.

.

Surgical Pathology Cancer Case Summary

COLON AND RECTUM: Resection, Including Transanal Disk Excision of Rectal

Neoplasms

.

Procedure

___ Sigmoidectomy

.

Tumor Site

___ Sigmoid colon

.

+ Tumor Location

+ ___ Entirely above the anterior peritoneal reflection

.

Tumor Size

Greatest dimension: 6.0 cm

.

Macroscopic Tumor Perforation

___ Not identified

.

Histologic Type

___ Adenocarcinoma

.

Histologic Grade

___ G2: Moderately differentiated

.

Tumor Extension

___ Tumor invades through the muscularis propria into pericolorectal

tissue

.

Margins

___ All margins are uninvolved by invasive carcinoma, high-grade

dysplasia, intramucosal adenocarcinoma, and adenoma

  - Margins examined: Proximal, distal, and mesocolic margin

+ Distance of invasive carcinoma from closest margin: 2.5 cm

+ Specify closest margin: Distal margin.

.

Treatment Effect

___ No known presurgical therapy

.

Lymphovascular Invasion

___ Not identified

.

Perineural Invasion

___ Not identified

.

+ Type of Polyp in Which Invasive Carcinoma Arose

+ ___ None identified

.

Tumor Deposits

___ Not identified

.

Regional Lymph Nodes

Number of Lymph Nodes Involved: 0

Number of Lymph Nodes Examined: 22

.

Pathologic Stage Classification

Primary Tumor (pT)

___ pT3:Tumor invades through the muscularis propria into pericolorectal

tissues

.

Regional Lymph Nodes (pN)

___ pN0:No regional lymph node metastasis

.

Distant Metastasis (pM)

___ pM1a: Metastasis to one site or organ is identified without peritoneal

metastasis

  - Specify site(s), if known: Liver

.

+ Additional Pathologic Findings

+ ___ Other polyps: Hyperplastic polyp

.

(JPM:; 07/19/2019)

QTP/07/19/2019

**********************************************************************

.                                                                02

Electronically signed:                                     .

Ajit Clark MD, Pathologist

NPI- [id number]

.                                                                01

Gross description:                                         .

A, B:  Please see gross description dictated by the pathologist located

under the Frozen Section portion of this case.

.

B.  Sectioning of the attached mesocolic fat reveals multiple possible

lymph nodes ranging from 0.1 cm up to 1.0 cm in greatest dimension.

Representatively submitted as follows:

B1.  Proximal margin.

B2.  Distal margin.

B3.  Mesocolic margin.

B4.  Distal margin polyp to mass, bisected.

B5.  Mass to closest pericolic margin.

B6.  Mass invading bowel wall.

B7.  Proximal uninvolved mucosa to mass.

B8.  Distal uninvolved mucosa to mass.

B9.  Uninvolved mucosa, proximal and distal.  (Distal = inked blue)

B10. Two lymph nodes, bisected, one inked black.

B11-B14. additional possible lymph nodes candidates.

([facility]; 7/17/2019)

.

/QMS

.                                                                02

Pathologist provided ICD-10:

C22.9, C18.7

.                                                                02

CPT                                                        .

030016, 213588, 080115, 259341

***Performed at:  01

   [address], [place]  [id number]

   MD Yonathan Wayne MD Phone:  [phone]

***Performed at:  02

   [address]  [id number]

   MD Ajit Clark MD Phone:  [phone]

## 2019-07-19 NOTE — PDOC
PROGRESS NOTES


History of Present Illness


History of Present Illness





ASSESSMENT AND PLAN:  





Colon cancer status post sigmoid colon resection 


status post cystoscopy with ureteral stent placement.   


HYPOKALEMIA











Aggressive wound care, 


PT, OT, 


home meds, 


p.r.n. narcotics pca morphine pump 


p.r.n. Zofran.


REPLETE K











39 MIN PT EXAM, CHART REVIEW, > 50% OF TIME SPENT WITH EXAM, CHART REVIEW, PT 

CARE COORDINATION








Pre-Op Diagnosis:


Colon cancer of the sigmoid colon





Post-Op Diagnosis:


same, metastasis to liver





Procedure Performed:


laparoscopic sigmoid colectomy, liver biopsy





Surgeon:


Odin Levine





Anesthesia Type:


GETA plus local





Blood Loss:


50





Specimans Obtained:


sigmoid colon, liver biopsy





Findings:


left lobe of the liver with whitish mass, c/w metastatic adenocarcinoma by f

rozen section, moderate size mass in low sigmoid colon, no obvious metastatic 

disease anywhere else





 OPERATIVE NOTE. 


OPERATIVE NOTE


Date:


Date:  Jul 16, 2019





Post-Op Diagnosis:


nl bladder;  hx colon ca





Procedure Performed:


cysto, pl ureteral caths'





Surgeon:


Kyung





Anesthesia Type:


Gen





Blood Loss:


min





Specimans Obtained:


neg





Findings:


nl bladder





Complications:


neg





Operative Note:


Under GA and in lith'y pos'n, IV abx admin'd





Cysto with 21 Scope was unrem.  UO's in nl pos'n.  5-Fr whstle tipped catheters 

were then threaded thru each ureter without diff.   The scope was removed prema  

tenorio was placed.  The ureteral cath's were then connected to the tenorio in usual

fashion.  A tenorio bag was the attached.   There were no complications.





Vitals


Vitals





Vital Signs








  Date Time  Temp Pulse Resp B/P (MAP) Pulse Ox O2 Delivery O2 Flow Rate FiO2


 


7/19/19 11:00 98.8 96 18 143/67 (92) 95 Room Air  





 98.8       


 


7/18/19 20:00       2.0 











Physical Exam


General:  Alert, Oriented X3, Cooperative, No acute distress


Heart:  Regular rate, Normal S1, No murmurs


Lungs:  Clear


Abdomen:  Soft, No tenderness, Other (incision healing nicely)


Extremities:  No clubbing, No cyanosis





Labs


LABS





Laboratory Tests








Test


 7/19/19


04:50


 


White Blood Count


 5.8 x10^3/uL


(4.0-11.0)


 


Red Blood Count


 4.40 x10^6/uL


(4.30-5.70)


 


Hemoglobin


 11.5 g/dL


(13.0-17.5)


 


Hematocrit


 35.0 %


(39.0-53.0)


 


Mean Corpuscular Volume 79 fL () 


 


Mean Corpuscular Hemoglobin 26 pg (25-35) 


 


Mean Corpuscular Hemoglobin


Concent 33 g/dL


(31-37)


 


Red Cell Distribution Width


 14.1 %


(11.5-14.5)


 


Platelet Count


 237 x10^3/uL


(140-400)


 


Neutrophils (%) (Auto) 62 % (31-73) 


 


Lymphocytes (%) (Auto) 22 % (24-48) 


 


Monocytes (%) (Auto) 13 % (0-9) 


 


Eosinophils (%) (Auto) 2 % (0-3) 


 


Basophils (%) (Auto) 1 % (0-3) 


 


Neutrophils # (Auto)


 3.6 x10^3/uL


(1.8-7.7)


 


Lymphocytes # (Auto)


 1.3 x10^3/uL


(1.0-4.8)


 


Monocytes # (Auto)


 0.7 x10^3/uL


(0.0-1.1)


 


Eosinophils # (Auto)


 0.1 x10^3/uL


(0.0-0.7)


 


Basophils # (Auto)


 0.1 x10^3/uL


(0.0-0.2)


 


Sodium Level


 139 mmol/L


(136-145)


 


Potassium Level


 3.4 mmol/L


(3.5-5.1)


 


Chloride Level


 102 mmol/L


()


 


Carbon Dioxide Level


 29 mmol/L


(21-32)


 


Anion Gap 8 (6-14) 


 


Blood Urea Nitrogen 7 mg/dL (8-26) 


 


Creatinine


 0.9 mg/dL


(0.7-1.3)


 


Estimated GFR


(Cockcroft-Gault) 91.7 





 


Glucose Level


 96 mg/dL


(70-99)


 


Calcium Level


 8.6 mg/dL


(8.5-10.1)











Comment


Review of Relevant


I have reviewed the following items fatimah (where applicable) has been applied.


Labs





Laboratory Tests








Test


 7/19/19


04:50


 


White Blood Count


 5.8 x10^3/uL


(4.0-11.0)


 


Red Blood Count


 4.40 x10^6/uL


(4.30-5.70)


 


Hemoglobin


 11.5 g/dL


(13.0-17.5)


 


Hematocrit


 35.0 %


(39.0-53.0)


 


Mean Corpuscular Volume 79 fL () 


 


Mean Corpuscular Hemoglobin 26 pg (25-35) 


 


Mean Corpuscular Hemoglobin


Concent 33 g/dL


(31-37)


 


Red Cell Distribution Width


 14.1 %


(11.5-14.5)


 


Platelet Count


 237 x10^3/uL


(140-400)


 


Neutrophils (%) (Auto) 62 % (31-73) 


 


Lymphocytes (%) (Auto) 22 % (24-48) 


 


Monocytes (%) (Auto) 13 % (0-9) 


 


Eosinophils (%) (Auto) 2 % (0-3) 


 


Basophils (%) (Auto) 1 % (0-3) 


 


Neutrophils # (Auto)


 3.6 x10^3/uL


(1.8-7.7)


 


Lymphocytes # (Auto)


 1.3 x10^3/uL


(1.0-4.8)


 


Monocytes # (Auto)


 0.7 x10^3/uL


(0.0-1.1)


 


Eosinophils # (Auto)


 0.1 x10^3/uL


(0.0-0.7)


 


Basophils # (Auto)


 0.1 x10^3/uL


(0.0-0.2)


 


Sodium Level


 139 mmol/L


(136-145)


 


Potassium Level


 3.4 mmol/L


(3.5-5.1)


 


Chloride Level


 102 mmol/L


()


 


Carbon Dioxide Level


 29 mmol/L


(21-32)


 


Anion Gap 8 (6-14) 


 


Blood Urea Nitrogen 7 mg/dL (8-26) 


 


Creatinine


 0.9 mg/dL


(0.7-1.3)


 


Estimated GFR


(Cockcroft-Gault) 91.7 





 


Glucose Level


 96 mg/dL


(70-99)


 


Calcium Level


 8.6 mg/dL


(8.5-10.1)








Laboratory Tests








Test


 7/19/19


04:50


 


White Blood Count


 5.8 x10^3/uL


(4.0-11.0)


 


Red Blood Count


 4.40 x10^6/uL


(4.30-5.70)


 


Hemoglobin


 11.5 g/dL


(13.0-17.5)


 


Hematocrit


 35.0 %


(39.0-53.0)


 


Mean Corpuscular Volume 79 fL () 


 


Mean Corpuscular Hemoglobin 26 pg (25-35) 


 


Mean Corpuscular Hemoglobin


Concent 33 g/dL


(31-37)


 


Red Cell Distribution Width


 14.1 %


(11.5-14.5)


 


Platelet Count


 237 x10^3/uL


(140-400)


 


Neutrophils (%) (Auto) 62 % (31-73) 


 


Lymphocytes (%) (Auto) 22 % (24-48) 


 


Monocytes (%) (Auto) 13 % (0-9) 


 


Eosinophils (%) (Auto) 2 % (0-3) 


 


Basophils (%) (Auto) 1 % (0-3) 


 


Neutrophils # (Auto)


 3.6 x10^3/uL


(1.8-7.7)


 


Lymphocytes # (Auto)


 1.3 x10^3/uL


(1.0-4.8)


 


Monocytes # (Auto)


 0.7 x10^3/uL


(0.0-1.1)


 


Eosinophils # (Auto)


 0.1 x10^3/uL


(0.0-0.7)


 


Basophils # (Auto)


 0.1 x10^3/uL


(0.0-0.2)


 


Sodium Level


 139 mmol/L


(136-145)


 


Potassium Level


 3.4 mmol/L


(3.5-5.1)


 


Chloride Level


 102 mmol/L


()


 


Carbon Dioxide Level


 29 mmol/L


(21-32)


 


Anion Gap 8 (6-14) 


 


Blood Urea Nitrogen 7 mg/dL (8-26) 


 


Creatinine


 0.9 mg/dL


(0.7-1.3)


 


Estimated GFR


(Cockcroft-Gault) 91.7 





 


Glucose Level


 96 mg/dL


(70-99)


 


Calcium Level


 8.6 mg/dL


(8.5-10.1)








Medications





Current Medications


Ondansetron HCl (Zofran) 4 mg PRN Q6HRS  PRN IV NAUSEA/VOMITING;  Start 7/16/19 

at 07:00;  Stop 7/17/19 at 06:59;  Status DC


Fentanyl Citrate (Fentanyl 2ml Vial) 25 mcg PRN Q5MIN  PRN IV MILD PAIN 1-3;  

Start 7/16/19 at 07:00;  Stop 7/17/19 at 06:59;  Status DC


Fentanyl Citrate (Fentanyl 2ml Vial) 50 mcg PRN Q5MIN  PRN IV MODERATE TO SEVERE

PAIN;  Start 7/16/19 at 07:00;  Stop 7/17/19 at 06:59;  Status DC


Morphine Sulfate (Morphine Sulfate) 1 mg PRN Q10MIN  PRN IV SEVERE PAIN 7-10 

Last administered on 7/16/19at 11:50;  Start 7/16/19 at 07:00;  Stop 7/17/19 at 

06:59;  Status DC


Ringer's Solution 1,000 ml @  30 mls/hr Q24H IV  Last administered on 7/16/19at 

11:33;  Start 7/16/19 at 07:00;  Stop 7/16/19 at 18:59;  Status DC


Hydromorphone HCl (Dilaudid) 0.5 mg PRN Q10MIN  PRN IV SEV PAIN, Second choice; 

Start 7/16/19 at 07:00;  Stop 7/17/19 at 06:59;  Status DC


Prochlorperazine Edisylate (Compazine) 5 mg PACU PRN  PRN IV NAUSEA, MRX1 Last 

administered on 7/16/19at 11:50;  Start 7/16/19 at 07:00;  Stop 7/17/19 at 

06:59;  Status DC


Cefoxitin Sodium (Mefoxin) 2 gm PREOP  1X IVP  Last administered on 7/16/19at 

08:32;  Start 7/16/19 at 06:00;  Stop 7/16/19 at 09:41;  Status DC


Bupivacaine HCl/ Epinephrine Bitart (Sensorcain-Mpf Epi 0.5%-1:030053) 30 ml S

TK-MED ONCE .ROUTE  Last administered on 7/16/19at 09:02;  Start 7/16/19 at 

06:31;  Stop 7/16/19 at 07:32;  Status DC


Iohexol (Omnipaque 300 Mg/ml) 50 ml STK-MED ONCE .ROUTE ;  Start 7/16/19 at 

06:33;  Stop 7/16/19 at 07:33;  Status DC


Lidocaine HCl (Glydo (Lidocaine) Jelly) 6 rene STK-MED ONCE .ROUTE ;  Start 

7/16/19 at 06:33;  Stop 7/16/19 at 07:34;  Status DC


Lidocaine HCl (Glydo (Lidocaine) Jelly) 6 rene STK-MED ONCE .ROUTE ;  Start 

7/16/19 at 06:34;  Stop 7/16/19 at 06:35;  Status Cancel


Lidocaine HCl (Glydo (Lidocaine) Jelly) 6 rene STK-MED ONCE .ROUTE ;  Start 

7/16/19 at 06:40;  Stop 7/16/19 at 07:40;  Status DC


Cefoxitin Sodium (Mefoxin) 2 gm 1X  ONCE IVP  Last administered on 7/16/19at 

10:05;  Start 7/16/19 at 10:00;  Stop 7/16/19 at 10:01;  Status DC


Enoxaparin Sodium (Lovenox 40mg Syringe) 40 mg Q24H SQ  Last administered on 

7/18/19at 22:27;  Start 7/16/19 at 22:00


Sodium Chloride (Normal Saline Flush) 3 ml QSHIFT  PRN IV AFTER MEDS AND BLOOD 

DRAWS;  Start 7/16/19 at 11:45


Ringer's Solution 1,000 ml @  50 mls/hr Q20H IV  Last administered on 7/19/19at 

06:19;  Start 7/16/19 at 11:35;  Stop 7/19/19 at 12:02;  Status DC


Naloxone HCl (Narcan) 0.4 mg PRN Q2MIN  PRN IV SEE INSTRUCTIONS;  Start 7/16/19 

at 11:45


Sodium Chloride 1,000 ml @  25 mls/hr Q24H IV ;  Start 7/16/19 at 11:35;  Stop 

7/17/19 at 07:31;  Status DC


Morphine Sulfate 30 ml @ 0 mls/hr CONT PRN  PRN IV PER PROTOCOL Last 

administered on 7/18/19at 14:56;  Start 7/16/19 at 11:45;  Stop 7/19/19 at 

09:23;  Status DC


Ondansetron HCl (Zofran) 4 mg PRN Q6HRS  PRN IV NAUESA, 1ST CHOICE;  Start 

7/16/19 at 11:45


Dexamethasone Sodium Phosphate (Decadron) 4 mg STK-MED ONCE .ROUTE ;  Start 

7/16/19 at 07:47;  Stop 7/17/19 at 19:06;  Status DC


Propofol 20 ml @ As Directed STK-MED ONCE IV ;  Start 7/16/19 at 07:47;  Stop 

7/17/19 at 19:06;  Status DC


Lidocaine HCl (Lidocaine Pf 2% Vial) 5 ml STK-MED ONCE .ROUTE ;  Start 7/16/19 

at 07:47;  Stop 7/17/19 at 19:06;  Status DC


Ondansetron HCl (Zofran) 4 mg STK-MED ONCE .ROUTE ;  Start 7/16/19 at 07:47;  

Stop 7/17/19 at 19:06;  Status DC


Midazolam HCl (Versed) 2 mg STK-MED ONCE .ROUTE ;  Start 7/16/19 at 07:47;  Stop

7/17/19 at 19:06;  Status DC


Fentanyl Citrate (Fentanyl 2ml Vial) 100 mcg STK-MED ONCE .ROUTE ;  Start 

7/16/19 at 07:47;  Stop 7/17/19 at 19:06;  Status DC


Rocuronium Bromide (Zemuron) 50 mg STK-MED ONCE .ROUTE ;  Start 7/16/19 at 

07:47;  Stop 7/17/19 at 19:06;  Status DC


Fentanyl Citrate (Fentanyl 2ml Vial) 100 mcg STK-MED ONCE .ROUTE ;  Start 

7/16/19 at 08:26;  Stop 7/17/19 at 19:06;  Status DC


Rocuronium Bromide (Zemuron) 100 mg STK-MED ONCE .ROUTE ;  Start 7/16/19 at 

09:07;  Stop 7/17/19 at 19:06;  Status DC


Ketamine HCl (Ketamine) 50 mg STK-MED ONCE .ROUTE ;  Start 7/16/19 at 09:35;  

Stop 7/17/19 at 19:06;  Status DC


Glycopyrrolate (Robinul) 1 mg STK-MED ONCE .ROUTE ;  Start 7/16/19 at 09:55;  

Stop 7/17/19 at 19:06;  Status DC


Neostigmine Methylsulfate (Neostigmine Methylsulfate) 5 mg STK-MED ONCE .ROUTE ;

 Start 7/16/19 at 09:55;  Stop 7/17/19 at 19:06;  Status DC


Hydromorphone HCl (Dilaudid) 2 mg STK-MED ONCE .ROUTE ;  Start 7/16/19 at 10:49;

 Stop 7/17/19 at 19:06;  Status DC


Sevoflurane (Ultane) 90 ml STK-MED ONCE IH ;  Start 7/16/19 at 11:01;  Stop 

7/17/19 at 19:06;  Status DC


Ketorolac Tromethamine (Toradol For Or Only) 30 mg STK-MED ONCE INJ ;  Start 

7/16/19 at 11:03;  Stop 7/17/19 at 19:06;  Status DC


Prochlorperazine Edisylate (Compazine) 10 mg STK-MED ONCE .ROUTE ;  Start 7 /16/19 at 11:08;  Stop 7/17/19 at 19:06;  Status DC


Oxycodone/ Acetaminophen (Percocet 5/325) 1 tab PRN Q4HRS  PRN PO MODERATE PAIN;

 Start 7/19/19 at 09:30


Morphine Sulfate (Morphine Sulfate) 2 mg PRN Q4HRS  PRN IV PAIN;  Start 7/19/19 

at 09:30


Oxycodone/ Acetaminophen (Percocet 5/325) 2 tab PRN Q4HRS  PRN PO SEVERE PAIN;  

Start 7/19/19 at 09:30





Active Scripts


Active


Reported


Metamucil (Psyllium Husk) 0.52 Gm Capsule 0.52 Gm PO DAILY 1 Days


Docusate Sodium 100 Mg Capsule 1 Cap PO DAILY


Vitals/I & O





Vital Sign - Last 24 Hours








 7/18/19 7/18/19 7/18/19 7/18/19





 14:56 15:00 15:30 19:35


 


Temp  98.4  98.0





  98.4  98.0


 


Pulse  96  93


 


Resp  16  18


 


B/P (MAP)  132/77 (95)  139/67 (91)


 


Pulse Ox  97  97


 


O2 Delivery Room Air Room Air Room Air Room Air


 


    





    





 7/18/19 7/18/19 7/19/19 7/19/19





 20:00 23:44 03:51 07:00


 


Temp  98.7 98.1 98.7





  98.7 98.1 98.7


 


Pulse  90 86 111


 


Resp  18 18 18


 


B/P (MAP)  124/69 (87) 131/75 (93) 123/62 (82)


 


Pulse Ox  96 97 94


 


O2 Delivery Room Air Room Air Room Air Room Air


 


O2 Flow Rate 2.0   


 


    





    





 7/19/19 7/19/19  





 07:39 11:00  


 


Temp  98.8  





  98.8  


 


Pulse  96  


 


Resp  18  


 


B/P (MAP)  143/67 (92)  


 


Pulse Ox  95  


 


O2 Delivery Room Air Room Air  














Intake and Output   


 


 7/18/19 7/18/19 7/19/19





 15:00 23:00 07:00


 


Intake Total 2000 ml 1000 ml 1100 ml


 


Output Total 800 ml  


 


Balance 1200 ml 1000 ml 1100 ml

















MARIA D URIBE MD          Jul 19, 2019 12:15

## 2019-07-19 NOTE — DISCH
DISCHARGE INSTRUCTIONS


Condition on Discharge


Condition on Discharge:  Stable





Activity After Discharge


Activity Instructions for Disc:  Activity as tolerated


Other activity instructions:  No lifting > 20 lbsc


Driving Instructions after Dis:  Do not drive





Diet after Discharge


Diet after Discharge:  Regular





Wound Incision Care


Wound/Incision Care:  May get incision wet, No wound care needed


Other wound/incision instructi:  no tub baths





Contacting the  after DC


Call your doctor for:  Concerns you may have





Follow-Up


Follow up with:  Dr Levine 1-2 weeks, call to schedule [phone]











MONTSERRAT KHALIL APRN            Jul 19, 2019 09:26

## 2019-07-19 NOTE — PDOC
SURGICAL PROGRESS NOTE


Subjective


Pt doing well, kiah clears, passing flatus and stool


Vital Signs





Vital Signs








  Date Time  Temp Pulse Resp B/P (MAP) Pulse Ox O2 Delivery O2 Flow Rate FiO2


 


7/19/19 11:00 98.8 96 18 143/67 (92) 95 Room Air  





 98.8       


 


7/18/19 20:00       2.0 








I&O











Intake and Output 


 


 7/19/19





 07:00


 


Intake Total 4100 ml


 


Output Total 800 ml


 


Balance 3300 ml


 


 


 


Intake Oral 2100 ml


 


IV Total 2000 ml


 


Output Urine Total 800 ml


 


# Voids 5








General:  Alert, Oriented X3, Cooperative, No acute distress


Abdomen:  Soft, No tenderness, Other (incision healing nicely)


Labs





Laboratory Tests








Test


 7/19/19


04:50


 


White Blood Count


 5.8 x10^3/uL


(4.0-11.0)


 


Red Blood Count


 4.40 x10^6/uL


(4.30-5.70)


 


Hemoglobin


 11.5 g/dL


(13.0-17.5)


 


Hematocrit


 35.0 %


(39.0-53.0)


 


Mean Corpuscular Volume 79 fL () 


 


Mean Corpuscular Hemoglobin 26 pg (25-35) 


 


Mean Corpuscular Hemoglobin


Concent 33 g/dL


(31-37)


 


Red Cell Distribution Width


 14.1 %


(11.5-14.5)


 


Platelet Count


 237 x10^3/uL


(140-400)


 


Neutrophils (%) (Auto) 62 % (31-73) 


 


Lymphocytes (%) (Auto) 22 % (24-48) 


 


Monocytes (%) (Auto) 13 % (0-9) 


 


Eosinophils (%) (Auto) 2 % (0-3) 


 


Basophils (%) (Auto) 1 % (0-3) 


 


Neutrophils # (Auto)


 3.6 x10^3/uL


(1.8-7.7)


 


Lymphocytes # (Auto)


 1.3 x10^3/uL


(1.0-4.8)


 


Monocytes # (Auto)


 0.7 x10^3/uL


(0.0-1.1)


 


Eosinophils # (Auto)


 0.1 x10^3/uL


(0.0-0.7)


 


Basophils # (Auto)


 0.1 x10^3/uL


(0.0-0.2)


 


Sodium Level


 139 mmol/L


(136-145)


 


Potassium Level


 3.4 mmol/L


(3.5-5.1)


 


Chloride Level


 102 mmol/L


()


 


Carbon Dioxide Level


 29 mmol/L


(21-32)


 


Anion Gap 8 (6-14) 


 


Blood Urea Nitrogen 7 mg/dL (8-26) 


 


Creatinine


 0.9 mg/dL


(0.7-1.3)


 


Estimated GFR


(Cockcroft-Gault) 91.7 





 


Glucose Level


 96 mg/dL


(70-99)


 


Calcium Level


 8.6 mg/dL


(8.5-10.1)








Laboratory Tests








Test


 7/19/19


04:50


 


White Blood Count


 5.8 x10^3/uL


(4.0-11.0)


 


Red Blood Count


 4.40 x10^6/uL


(4.30-5.70)


 


Hemoglobin


 11.5 g/dL


(13.0-17.5)


 


Hematocrit


 35.0 %


(39.0-53.0)


 


Mean Corpuscular Volume 79 fL () 


 


Mean Corpuscular Hemoglobin 26 pg (25-35) 


 


Mean Corpuscular Hemoglobin


Concent 33 g/dL


(31-37)


 


Red Cell Distribution Width


 14.1 %


(11.5-14.5)


 


Platelet Count


 237 x10^3/uL


(140-400)


 


Neutrophils (%) (Auto) 62 % (31-73) 


 


Lymphocytes (%) (Auto) 22 % (24-48) 


 


Monocytes (%) (Auto) 13 % (0-9) 


 


Eosinophils (%) (Auto) 2 % (0-3) 


 


Basophils (%) (Auto) 1 % (0-3) 


 


Neutrophils # (Auto)


 3.6 x10^3/uL


(1.8-7.7)


 


Lymphocytes # (Auto)


 1.3 x10^3/uL


(1.0-4.8)


 


Monocytes # (Auto)


 0.7 x10^3/uL


(0.0-1.1)


 


Eosinophils # (Auto)


 0.1 x10^3/uL


(0.0-0.7)


 


Basophils # (Auto)


 0.1 x10^3/uL


(0.0-0.2)


 


Sodium Level


 139 mmol/L


(136-145)


 


Potassium Level


 3.4 mmol/L


(3.5-5.1)


 


Chloride Level


 102 mmol/L


()


 


Carbon Dioxide Level


 29 mmol/L


(21-32)


 


Anion Gap 8 (6-14) 


 


Blood Urea Nitrogen 7 mg/dL (8-26) 


 


Creatinine


 0.9 mg/dL


(0.7-1.3)


 


Estimated GFR


(Cockcroft-Gault) 91.7 





 


Glucose Level


 96 mg/dL


(70-99)


 


Calcium Level


 8.6 mg/dL


(8.5-10.1)








Problem List


s/p lap sigmoid


doing well


ADAT


PO pain meds


plan d/c in AM











DOLLY CHRISTENSEN MD             Jul 19, 2019 12:03

## 2019-07-20 VITALS — DIASTOLIC BLOOD PRESSURE: 61 MMHG | SYSTOLIC BLOOD PRESSURE: 116 MMHG

## 2019-07-20 VITALS — SYSTOLIC BLOOD PRESSURE: 110 MMHG | DIASTOLIC BLOOD PRESSURE: 60 MMHG

## 2019-07-20 VITALS — DIASTOLIC BLOOD PRESSURE: 61 MMHG | SYSTOLIC BLOOD PRESSURE: 107 MMHG

## 2019-07-20 LAB
ANION GAP SERPL CALC-SCNC: 8 MMOL/L (ref 6–14)
BASOPHILS # BLD AUTO: 0 X10^3/UL (ref 0–0.2)
BASOPHILS NFR BLD: 1 % (ref 0–3)
BUN SERPL-MCNC: 7 MG/DL (ref 8–26)
CALCIUM SERPL-MCNC: 8.9 MG/DL (ref 8.5–10.1)
CHLORIDE SERPL-SCNC: 105 MMOL/L (ref 98–107)
CO2 SERPL-SCNC: 29 MMOL/L (ref 21–32)
CREAT SERPL-MCNC: 1 MG/DL (ref 0.7–1.3)
EOSINOPHIL NFR BLD: 0.3 X10^3/UL (ref 0–0.7)
EOSINOPHIL NFR BLD: 5 % (ref 0–3)
ERYTHROCYTE [DISTWIDTH] IN BLOOD BY AUTOMATED COUNT: 14.3 % (ref 11.5–14.5)
GFR SERPLBLD BASED ON 1.73 SQ M-ARVRAT: 81.2 ML/MIN
GLUCOSE SERPL-MCNC: 93 MG/DL (ref 70–99)
HCT VFR BLD CALC: 35.5 % (ref 39–53)
HGB BLD-MCNC: 11.9 G/DL (ref 13–17.5)
LYMPHOCYTES # BLD: 1.7 X10^3/UL (ref 1–4.8)
LYMPHOCYTES NFR BLD AUTO: 32 % (ref 24–48)
MCH RBC QN AUTO: 27 PG (ref 25–35)
MCHC RBC AUTO-ENTMCNC: 33 G/DL (ref 31–37)
MCV RBC AUTO: 80 FL (ref 79–100)
MONO #: 0.7 X10^3/UL (ref 0–1.1)
MONOCYTES NFR BLD: 12 % (ref 0–9)
NEUT #: 2.8 X10^3/UL (ref 1.8–7.7)
NEUTROPHILS NFR BLD AUTO: 51 % (ref 31–73)
PLATELET # BLD AUTO: 248 X10^3/UL (ref 140–400)
POTASSIUM SERPL-SCNC: 3.8 MMOL/L (ref 3.5–5.1)
RBC # BLD AUTO: 4.44 X10^6/UL (ref 4.3–5.7)
SODIUM SERPL-SCNC: 142 MMOL/L (ref 136–145)
WBC # BLD AUTO: 5.4 X10^3/UL (ref 4–11)

## 2019-07-20 RX ADMIN — OXYCODONE HYDROCHLORIDE AND ACETAMINOPHEN PRN TAB: 5; 325 TABLET ORAL at 07:23

## 2019-07-20 RX ADMIN — OXYCODONE HYDROCHLORIDE AND ACETAMINOPHEN PRN TAB: 5; 325 TABLET ORAL at 11:24

## 2019-07-20 NOTE — PDOC
PROGRESS NOTES


History of Present Illness


History of Present Illness





discharge dx ==================================








Colon cancer status post sigmoid colon resection 


status post cystoscopy with ureteral stent placement.   


HYPOKALEMIA











Aggressive wound care, 


PT, OT, 


home meds, 


p.r.n. narcotics pca morphine pump 


p.r.n. Zofran.


REPLETE K


see surgery 2 weeks











25 MIN PT EXAM, CHART REVIEW, d/c planning   > 50% OF TIME SPENT WITH EXAM, 

CHART REVIEW, PT CARE COORDINATION








Pre-Op Diagnosis:


Colon cancer of the sigmoid colon





Post-Op Diagnosis:


same, metastasis to liver





Procedure Performed:


laparoscopic sigmoid colectomy, liver biopsy





Surgeon:


Odin Levine





Anesthesia Type:


GETA plus local





Blood Loss:


50





Specimans Obtained:


sigmoid colon, liver biopsy





Findings:


left lobe of the liver with whitish mass, c/w metastatic adenocarcinoma by 

frozen section, moderate size mass in low sigmoid colon, no obvious metastatic 

disease anywhere else





 OPERATIVE NOTE. 


OPERATIVE NOTE


Date:


Date:  Jul 16, 2019





Post-Op Diagnosis:


nl bladder;  hx colon ca





Procedure Performed:


cysto, pl ureteral caths'





Surgeon:


Kyung





Anesthesia Type:


Gen





Blood Loss:


min





Specimans Obtained:


neg





Findings:


nl bladder





Complications:


neg





Operative Note:


Under GA and in lith'y pos'n, IV abx admin'd





Cysto with 21 Scope was unrem.  UO's in nl pos'n.  5-Fr whstle tipped catheters 

were then threaded thru each ureter without diff.   The scope was removed prema  

tenorio was placed.  The ureteral cath's were then connected to the tenorio in usual

fashion.  A tenorio bag was the attached.   There were no complications.





Vitals


Vitals





Vital Signs








  Date Time  Temp Pulse Resp B/P (MAP) Pulse Ox O2 Delivery O2 Flow Rate FiO2


 


7/20/19 09:46      Room Air  


 


7/20/19 07:00 98.8 78 16 116/61 (79) 96   





 98.8       











Physical Exam


General:  Alert, Oriented X3, Cooperative, No acute distress (and clean dry and 

intact)


Heart:  Regular rate, Normal S1, No murmurs


Lungs:  Clear


Abdomen:  Normal bowel sounds, Soft, No tenderness, Other


Extremities:  No clubbing, No cyanosis, No edema


Skin:  No rashes





Labs


LABS





Laboratory Tests








Test


 7/20/19


04:30


 


White Blood Count


 5.4 x10^3/uL


(4.0-11.0)


 


Red Blood Count


 4.44 x10^6/uL


(4.30-5.70)


 


Hemoglobin


 11.9 g/dL


(13.0-17.5)


 


Hematocrit


 35.5 %


(39.0-53.0)


 


Mean Corpuscular Volume 80 fL () 


 


Mean Corpuscular Hemoglobin 27 pg (25-35) 


 


Mean Corpuscular Hemoglobin


Concent 33 g/dL


(31-37)


 


Red Cell Distribution Width


 14.3 %


(11.5-14.5)


 


Platelet Count


 248 x10^3/uL


(140-400)


 


Neutrophils (%) (Auto) 51 % (31-73) 


 


Lymphocytes (%) (Auto) 32 % (24-48) 


 


Monocytes (%) (Auto) 12 % (0-9) 


 


Eosinophils (%) (Auto) 5 % (0-3) 


 


Basophils (%) (Auto) 1 % (0-3) 


 


Neutrophils # (Auto)


 2.8 x10^3/uL


(1.8-7.7)


 


Lymphocytes # (Auto)


 1.7 x10^3/uL


(1.0-4.8)


 


Monocytes # (Auto)


 0.7 x10^3/uL


(0.0-1.1)


 


Eosinophils # (Auto)


 0.3 x10^3/uL


(0.0-0.7)


 


Basophils # (Auto)


 0.0 x10^3/uL


(0.0-0.2)


 


Sodium Level


 142 mmol/L


(136-145)


 


Potassium Level


 3.8 mmol/L


(3.5-5.1)


 


Chloride Level


 105 mmol/L


()


 


Carbon Dioxide Level


 29 mmol/L


(21-32)


 


Anion Gap 8 (6-14) 


 


Blood Urea Nitrogen 7 mg/dL (8-26) 


 


Creatinine


 1.0 mg/dL


(0.7-1.3)


 


Estimated GFR


(Cockcroft-Gault) 81.2 





 


Glucose Level


 93 mg/dL


(70-99)


 


Calcium Level


 8.9 mg/dL


(8.5-10.1)











Comment


Review of Relevant


I have reviewed the following items fatimah (where applicable) has been applied.


Labs





Laboratory Tests








Test


 7/19/19


04:50 7/20/19


04:30


 


White Blood Count


 5.8 x10^3/uL


(4.0-11.0) 5.4 x10^3/uL


(4.0-11.0)


 


Red Blood Count


 4.40 x10^6/uL


(4.30-5.70) 4.44 x10^6/uL


(4.30-5.70)


 


Hemoglobin


 11.5 g/dL


(13.0-17.5) 11.9 g/dL


(13.0-17.5)


 


Hematocrit


 35.0 %


(39.0-53.0) 35.5 %


(39.0-53.0)


 


Mean Corpuscular Volume 79 fL ()  80 fL () 


 


Mean Corpuscular Hemoglobin 26 pg (25-35)  27 pg (25-35) 


 


Mean Corpuscular Hemoglobin


Concent 33 g/dL


(31-37) 33 g/dL


(31-37)


 


Red Cell Distribution Width


 14.1 %


(11.5-14.5) 14.3 %


(11.5-14.5)


 


Platelet Count


 237 x10^3/uL


(140-400) 248 x10^3/uL


(140-400)


 


Neutrophils (%) (Auto) 62 % (31-73)  51 % (31-73) 


 


Lymphocytes (%) (Auto) 22 % (24-48)  32 % (24-48) 


 


Monocytes (%) (Auto) 13 % (0-9)  12 % (0-9) 


 


Eosinophils (%) (Auto) 2 % (0-3)  5 % (0-3) 


 


Basophils (%) (Auto) 1 % (0-3)  1 % (0-3) 


 


Neutrophils # (Auto)


 3.6 x10^3/uL


(1.8-7.7) 2.8 x10^3/uL


(1.8-7.7)


 


Lymphocytes # (Auto)


 1.3 x10^3/uL


(1.0-4.8) 1.7 x10^3/uL


(1.0-4.8)


 


Monocytes # (Auto)


 0.7 x10^3/uL


(0.0-1.1) 0.7 x10^3/uL


(0.0-1.1)


 


Eosinophils # (Auto)


 0.1 x10^3/uL


(0.0-0.7) 0.3 x10^3/uL


(0.0-0.7)


 


Basophils # (Auto)


 0.1 x10^3/uL


(0.0-0.2) 0.0 x10^3/uL


(0.0-0.2)


 


Sodium Level


 139 mmol/L


(136-145) 142 mmol/L


(136-145)


 


Potassium Level


 3.4 mmol/L


(3.5-5.1) 3.8 mmol/L


(3.5-5.1)


 


Chloride Level


 102 mmol/L


() 105 mmol/L


()


 


Carbon Dioxide Level


 29 mmol/L


(21-32) 29 mmol/L


(21-32)


 


Anion Gap 8 (6-14)  8 (6-14) 


 


Blood Urea Nitrogen 7 mg/dL (8-26)  7 mg/dL (8-26) 


 


Creatinine


 0.9 mg/dL


(0.7-1.3) 1.0 mg/dL


(0.7-1.3)


 


Estimated GFR


(Cockcroft-Gault) 91.7 


 81.2 





 


Glucose Level


 96 mg/dL


(70-99) 93 mg/dL


(70-99)


 


Calcium Level


 8.6 mg/dL


(8.5-10.1) 8.9 mg/dL


(8.5-10.1)








Laboratory Tests








Test


 7/20/19


04:30


 


White Blood Count


 5.4 x10^3/uL


(4.0-11.0)


 


Red Blood Count


 4.44 x10^6/uL


(4.30-5.70)


 


Hemoglobin


 11.9 g/dL


(13.0-17.5)


 


Hematocrit


 35.5 %


(39.0-53.0)


 


Mean Corpuscular Volume 80 fL () 


 


Mean Corpuscular Hemoglobin 27 pg (25-35) 


 


Mean Corpuscular Hemoglobin


Concent 33 g/dL


(31-37)


 


Red Cell Distribution Width


 14.3 %


(11.5-14.5)


 


Platelet Count


 248 x10^3/uL


(140-400)


 


Neutrophils (%) (Auto) 51 % (31-73) 


 


Lymphocytes (%) (Auto) 32 % (24-48) 


 


Monocytes (%) (Auto) 12 % (0-9) 


 


Eosinophils (%) (Auto) 5 % (0-3) 


 


Basophils (%) (Auto) 1 % (0-3) 


 


Neutrophils # (Auto)


 2.8 x10^3/uL


(1.8-7.7)


 


Lymphocytes # (Auto)


 1.7 x10^3/uL


(1.0-4.8)


 


Monocytes # (Auto)


 0.7 x10^3/uL


(0.0-1.1)


 


Eosinophils # (Auto)


 0.3 x10^3/uL


(0.0-0.7)


 


Basophils # (Auto)


 0.0 x10^3/uL


(0.0-0.2)


 


Sodium Level


 142 mmol/L


(136-145)


 


Potassium Level


 3.8 mmol/L


(3.5-5.1)


 


Chloride Level


 105 mmol/L


()


 


Carbon Dioxide Level


 29 mmol/L


(21-32)


 


Anion Gap 8 (6-14) 


 


Blood Urea Nitrogen 7 mg/dL (8-26) 


 


Creatinine


 1.0 mg/dL


(0.7-1.3)


 


Estimated GFR


(Cockcroft-Gault) 81.2 





 


Glucose Level


 93 mg/dL


(70-99)


 


Calcium Level


 8.9 mg/dL


(8.5-10.1)








Medications





Current Medications


Ondansetron HCl (Zofran) 4 mg PRN Q6HRS  PRN IV NAUSEA/VOMITING;  Start 7/16/19 

at 07:00;  Stop 7/17/19 at 06:59;  Status DC


Fentanyl Citrate (Fentanyl 2ml Vial) 25 mcg PRN Q5MIN  PRN IV MILD PAIN 1-3;  

Start 7/16/19 at 07:00;  Stop 7/17/19 at 06:59;  Status DC


Fentanyl Citrate (Fentanyl 2ml Vial) 50 mcg PRN Q5MIN  PRN IV MODERATE TO SEVERE

PAIN;  Start 7/16/19 at 07:00;  Stop 7/17/19 at 06:59;  Status DC


Morphine Sulfate (Morphine Sulfate) 1 mg PRN Q10MIN  PRN IV SEVERE PAIN 7-10 

Last administered on 7/16/19at 11:50;  Start 7/16/19 at 07:00;  Stop 7/17/19 at 

06:59;  Status DC


Ringer's Solution 1,000 ml @  30 mls/hr Q24H IV  Last administered on 7/16/19at 

11:33;  Start 7/16/19 at 07:00;  Stop 7/16/19 at 18:59;  Status DC


Hydromorphone HCl (Dilaudid) 0.5 mg PRN Q10MIN  PRN IV SEV PAIN, Second choice; 

Start 7/16/19 at 07:00;  Stop 7/17/19 at 06:59;  Status DC


Prochlorperazine Edisylate (Compazine) 5 mg PACU PRN  PRN IV NAUSEA, MRX1 Last 

administered on 7/16/19at 11:50;  Start 7/16/19 at 07:00;  Stop 7/17/19 at 

06:59;  Status DC


Cefoxitin Sodium (Mefoxin) 2 gm PREOP  1X IVP  Last administered on 7/16/19at 

08:32;  Start 7/16/19 at 06:00;  Stop 7/16/19 at 09:41;  Status DC


Bupivacaine HCl/ Epinephrine Bitart (Sensorcain-Mpf Epi 0.5%-[id number]) 30 ml 

STK-MED ONCE .ROUTE  Last administered on 7/16/19at 09:02;  Start 7/16/19 at 

06:31;  Stop 7/16/19 at 07:32;  Status DC


Iohexol (Omnipaque 300 Mg/ml) 50 ml STK-MED ONCE .ROUTE ;  Start 7/16/19 at 

06:33;  Stop 7/16/19 at 07:33;  Status DC


Lidocaine HCl (Glydo (Lidocaine) Jelly) 6 rene STK-MED ONCE .ROUTE ;  Start 

7/16/19 at 06:33;  Stop 7/16/19 at 07:34;  Status DC


Lidocaine HCl (Glydo (Lidocaine) Jelly) 6 rene STK-MED ONCE .ROUTE ;  Start 

7/16/19 at 06:34;  Stop 7/16/19 at 06:35;  Status Cancel


Lidocaine HCl (Glydo (Lidocaine) Jelly) 6 rene STK-MED ONCE .ROUTE ;  Start 

7/16/19 at 06:40;  Stop 7/16/19 at 07:40;  Status DC


Cefoxitin Sodium (Mefoxin) 2 gm 1X  ONCE IVP  Last administered on 7/16/19at 

10:05;  Start 7/16/19 at 10:00;  Stop 7/16/19 at 10:01;  Status DC


Enoxaparin Sodium (Lovenox 40mg Syringe) 40 mg Q24H SQ  Last administered on 

7/18/19at 22:27;  Start 7/16/19 at 22:00


Sodium Chloride (Normal Saline Flush) 3 ml QSHIFT  PRN IV AFTER MEDS AND BLOOD 

DRAWS;  Start 7/16/19 at 11:45


Ringer's Solution 1,000 ml @  50 mls/hr Q20H IV  Last administered on 7/19/19at 

06:19;  Start 7/16/19 at 11:35;  Stop 7/19/19 at 12:02;  Status DC


Naloxone HCl (Narcan) 0.4 mg PRN Q2MIN  PRN IV SEE INSTRUCTIONS;  Start 7/16/19 

at 11:45


Sodium Chloride 1,000 ml @  25 mls/hr Q24H IV ;  Start 7/16/19 at 11:35;  Stop 

7/17/19 at 07:31;  Status DC


Morphine Sulfate 30 ml @ 0 mls/hr CONT PRN  PRN IV PER PROTOCOL Last 

administered on 7/18/19at 14:56;  Start 7/16/19 at 11:45;  Stop 7/19/19 at 

09:23;  Status DC


Ondansetron HCl (Zofran) 4 mg PRN Q6HRS  PRN IV NAUESA, 1ST CHOICE;  Start 

7/16/19 at 11:45


Dexamethasone Sodium Phosphate (Decadron) 4 mg STK-MED ONCE .ROUTE ;  Start 

7/16/19 at 07:47;  Stop 7/17/19 at 19:06;  Status DC


Propofol 20 ml @ As Directed STK-MED ONCE IV ;  Start 7/16/19 at 07:47;  Stop 

7/17/19 at 19:06;  Status DC


Lidocaine HCl (Lidocaine Pf 2% Vial) 5 ml STK-MED ONCE .ROUTE ;  Start 7/16/19 

at 07:47;  Stop 7/17/19 at 19:06;  Status DC


Ondansetron HCl (Zofran) 4 mg STK-MED ONCE .ROUTE ;  Start 7/16/19 at 07:47;  

Stop 7/17/19 at 19:06;  Status DC


Midazolam HCl (Versed) 2 mg STK-MED ONCE .ROUTE ;  Start 7/16/19 at 07:47;  Stop

7/17/19 at 19:06;  Status DC


Fentanyl Citrate (Fentanyl 2ml Vial) 100 mcg STK-MED ONCE .ROUTE ;  Start 

7/16/19 at 07:47;  Stop 7/17/19 at 19:06;  Status DC


Rocuronium Bromide (Zemuron) 50 mg STK-MED ONCE .ROUTE ;  Start 7/16/19 at 

07:47;  Stop 7/17/19 at 19:06;  Status DC


Fentanyl Citrate (Fentanyl 2ml Vial) 100 mcg STK-MED ONCE .ROUTE ;  Start 

7/16/19 at 08:26;  Stop 7/17/19 at 19:06;  Status DC


Rocuronium Bromide (Zemuron) 100 mg STK-MED ONCE .ROUTE ;  Start 7/16/19 at 

09:07;  Stop 7/17/19 at 19:06;  Status DC


Ketamine HCl (Ketamine) 50 mg STK-MED ONCE .ROUTE ;  Start 7/16/19 at 09:35;  

Stop 7/17/19 at 19:06;  Status DC


Glycopyrrolate (Robinul) 1 mg STK-MED ONCE .ROUTE ;  Start 7/16/19 at 09:55;  

Stop 7/17/19 at 19:06;  Status DC


Neostigmine Methylsulfate (Neostigmine Methylsulfate) 5 mg STK-MED ONCE .ROUTE ;

 Start 7/16/19 at 09:55;  Stop 7/17/19 at 19:06;  Status DC


Hydromorphone HCl (Dilaudid) 2 mg STK-MED ONCE .ROUTE ;  Start 7/16/19 at 10:49;

 Stop 7/17/19 at 19:06;  Status DC


Sevoflurane (Ultane) 90 ml STK-MED ONCE IH ;  Start 7/16/19 at 11:01;  Stop 

7/17/19 at 19:06;  Status DC


Ketorolac Tromethamine (Toradol For Or Only) 30 mg STK-MED ONCE INJ ;  Start 

7/16/19 at 11:03;  Stop 7/17/19 at 19:06;  Status DC


Prochlorperazine Edisylate (Compazine) 10 mg STK-MED ONCE .ROUTE ;  Start 

7/16/19 at 11:08;  Stop 7/17/19 at 19:06;  Status DC


Oxycodone/ Acetaminophen (Percocet 5/325) 1 tab PRN Q4HRS  PRN PO MODERATE PAIN;

 Start 7/19/19 at 09:30


Morphine Sulfate (Morphine Sulfate) 2 mg PRN Q4HRS  PRN IV PAIN;  Start 7/19/19 

at 09:30


Oxycodone/ Acetaminophen (Percocet 5/325) 2 tab PRN Q4HRS  PRN PO SEVERE PAIN 

Last administered on 7/20/19at 07:23;  Start 7/19/19 at 09:30


Diphenhydramine HCl (Benadryl) 25 mg PRN QHS  PRN PO INSOMNIA;  Start 7/19/19 at

13:15


Potassium Chloride (Klor-Con) 40 meq 1X  ONCE PO  Last administered on 7/19/19at

15:39;  Start 7/19/19 at 14:30;  Stop 7/19/19 at 14:31;  Status DC


Potassium Chloride (Klor-Con) 20 meq DAILYWBKFT PO  Last administered on 

7/20/19at 09:43;  Start 7/20/19 at 08:00





Active Scripts


Active


Reported


Metamucil (Psyllium Husk) 0.52 Gm Capsule 0.52 Gm PO DAILY 1 Days


Docusate Sodium 100 Mg Capsule 1 Cap PO DAILY


Vitals/I & O





Vital Sign - Last 24 Hours








 7/19/19 7/19/19 7/19/19 7/19/19





 11:00 15:00 17:18 19:50


 


Temp 98.8 97.8  98.3





 98.8 97.8  98.3


 


Pulse 96 97  109


 


Resp 18 18  18


 


B/P (MAP) 143/67 (92) 139/79 (99)  140/71 (94)


 


Pulse Ox 95 97  97


 


O2 Delivery Room Air Room Air Room Air Room Air


 


    





    





 7/19/19 7/19/19 7/19/19 7/19/19





 20:00 21:18 22:18 23:50


 


Temp    98.0





    98.0


 


Pulse    74


 


Resp  20 20 16


 


B/P (MAP)    123/67 (85)


 


Pulse Ox  97 97 98


 


O2 Delivery Room Air Room Air  Room Air


 


    





    





 7/20/19 7/20/19 7/20/19 7/20/19





 04:00 07:00 07:23 08:30


 


Temp 98.7 98.8  





 98.7 98.8  


 


Pulse 61 78  


 


Resp 16 16  


 


B/P (MAP) 110/60 (77) 116/61 (79)  


 


Pulse Ox 97 96  


 


O2 Delivery Room Air Room Air Room Air Room Air


 


    





    





 7/20/19   





 09:46   


 


O2 Delivery Room Air   














Intake and Output   


 


 7/19/19 7/19/19 7/20/19





 14:59 22:59 06:59


 


Intake Total  480 ml 320 ml


 


Balance  480 ml 320 ml

















MARIA D URIBE MD          Jul 20, 2019 10:30

## 2019-07-20 NOTE — PDOC
SURGICAL PROGRESS NOTE


Subjective


Patient doing quite well tolerating diet having bowel movements


Vital Signs





Vital Signs








  Date Time  Temp Pulse Resp B/P (MAP) Pulse Ox O2 Delivery O2 Flow Rate FiO2


 


7/20/19 09:46      Room Air  


 


7/20/19 07:00 98.8 78 16 116/61 (79) 96   





 98.8       








I&O











Intake and Output 


 


 7/20/19





 06:59


 


Intake Total 800 ml


 


Balance 800 ml


 


 


 


Intake Oral 800 ml








PATIENT HAS A FERREIRA:  No


General:  Alert, Oriented X3, Cooperative, No acute distress (and clean dry and 

intact)


Abdomen:  Normal bowel sounds, Soft, No tenderness, Other


Labs





Laboratory Tests








Test


 7/19/19


04:50 7/20/19


04:30


 


White Blood Count


 5.8 x10^3/uL


(4.0-11.0) 5.4 x10^3/uL


(4.0-11.0)


 


Red Blood Count


 4.40 x10^6/uL


(4.30-5.70) 4.44 x10^6/uL


(4.30-5.70)


 


Hemoglobin


 11.5 g/dL


(13.0-17.5) 11.9 g/dL


(13.0-17.5)


 


Hematocrit


 35.0 %


(39.0-53.0) 35.5 %


(39.0-53.0)


 


Mean Corpuscular Volume 79 fL ()  80 fL () 


 


Mean Corpuscular Hemoglobin 26 pg (25-35)  27 pg (25-35) 


 


Mean Corpuscular Hemoglobin


Concent 33 g/dL


(31-37) 33 g/dL


(31-37)


 


Red Cell Distribution Width


 14.1 %


(11.5-14.5) 14.3 %


(11.5-14.5)


 


Platelet Count


 237 x10^3/uL


(140-400) 248 x10^3/uL


(140-400)


 


Neutrophils (%) (Auto) 62 % (31-73)  51 % (31-73) 


 


Lymphocytes (%) (Auto) 22 % (24-48)  32 % (24-48) 


 


Monocytes (%) (Auto) 13 % (0-9)  12 % (0-9) 


 


Eosinophils (%) (Auto) 2 % (0-3)  5 % (0-3) 


 


Basophils (%) (Auto) 1 % (0-3)  1 % (0-3) 


 


Neutrophils # (Auto)


 3.6 x10^3/uL


(1.8-7.7) 2.8 x10^3/uL


(1.8-7.7)


 


Lymphocytes # (Auto)


 1.3 x10^3/uL


(1.0-4.8) 1.7 x10^3/uL


(1.0-4.8)


 


Monocytes # (Auto)


 0.7 x10^3/uL


(0.0-1.1) 0.7 x10^3/uL


(0.0-1.1)


 


Eosinophils # (Auto)


 0.1 x10^3/uL


(0.0-0.7) 0.3 x10^3/uL


(0.0-0.7)


 


Basophils # (Auto)


 0.1 x10^3/uL


(0.0-0.2) 0.0 x10^3/uL


(0.0-0.2)


 


Sodium Level


 139 mmol/L


(136-145) 142 mmol/L


(136-145)


 


Potassium Level


 3.4 mmol/L


(3.5-5.1) 3.8 mmol/L


(3.5-5.1)


 


Chloride Level


 102 mmol/L


() 105 mmol/L


()


 


Carbon Dioxide Level


 29 mmol/L


(21-32) 29 mmol/L


(21-32)


 


Anion Gap 8 (6-14)  8 (6-14) 


 


Blood Urea Nitrogen 7 mg/dL (8-26)  7 mg/dL (8-26) 


 


Creatinine


 0.9 mg/dL


(0.7-1.3) 1.0 mg/dL


(0.7-1.3)


 


Estimated GFR


(Cockcroft-Gault) 91.7 


 81.2 





 


Glucose Level


 96 mg/dL


(70-99) 93 mg/dL


(70-99)


 


Calcium Level


 8.6 mg/dL


(8.5-10.1) 8.9 mg/dL


(8.5-10.1)








Laboratory Tests








Test


 7/20/19


04:30


 


White Blood Count


 5.4 x10^3/uL


(4.0-11.0)


 


Red Blood Count


 4.44 x10^6/uL


(4.30-5.70)


 


Hemoglobin


 11.9 g/dL


(13.0-17.5)


 


Hematocrit


 35.5 %


(39.0-53.0)


 


Mean Corpuscular Volume 80 fL () 


 


Mean Corpuscular Hemoglobin 27 pg (25-35) 


 


Mean Corpuscular Hemoglobin


Concent 33 g/dL


(31-37)


 


Red Cell Distribution Width


 14.3 %


(11.5-14.5)


 


Platelet Count


 248 x10^3/uL


(140-400)


 


Neutrophils (%) (Auto) 51 % (31-73) 


 


Lymphocytes (%) (Auto) 32 % (24-48) 


 


Monocytes (%) (Auto) 12 % (0-9) 


 


Eosinophils (%) (Auto) 5 % (0-3) 


 


Basophils (%) (Auto) 1 % (0-3) 


 


Neutrophils # (Auto)


 2.8 x10^3/uL


(1.8-7.7)


 


Lymphocytes # (Auto)


 1.7 x10^3/uL


(1.0-4.8)


 


Monocytes # (Auto)


 0.7 x10^3/uL


(0.0-1.1)


 


Eosinophils # (Auto)


 0.3 x10^3/uL


(0.0-0.7)


 


Basophils # (Auto)


 0.0 x10^3/uL


(0.0-0.2)


 


Sodium Level


 142 mmol/L


(136-145)


 


Potassium Level


 3.8 mmol/L


(3.5-5.1)


 


Chloride Level


 105 mmol/L


()


 


Carbon Dioxide Level


 29 mmol/L


(21-32)


 


Anion Gap 8 (6-14) 


 


Blood Urea Nitrogen 7 mg/dL (8-26) 


 


Creatinine


 1.0 mg/dL


(0.7-1.3)


 


Estimated GFR


(Cockcroft-Gault) 81.2 





 


Glucose Level


 93 mg/dL


(70-99)


 


Calcium Level


 8.9 mg/dL


(8.5-10.1)








Assessment/Plan


Status post sigmoid colon resection doing quite well plan discharge to home











MARIA D PISANO MD             Jul 20, 2019 10:21

## 2019-07-20 NOTE — PDOC3
Discharge Summary


Date of Admission:  Jul 16, 2019


Date of Discharge:  Jul 20, 2019


Follow-Up:  3-5 days


Admitting Diagnosis comment:





discharge dx ==================================








Colon cancer status post sigmoid colon resection 


status post cystoscopy with ureteral stent placement.   


HYPOKALEMIA











Aggressive wound care, 


PT, OT, 


home meds, 


p.r.n. narcotics pca morphine pump 


p.r.n. Zofran.


REPLETE K


see surgery 2 weeks











25 MIN PT EXAM, CHART REVIEW, d/c planning   > 50% OF TIME SPENT WITH EXAM, 

CHART REVIEW, PT CARE COORDINATION








Pre-Op Diagnosis:


Colon cancer of the sigmoid colon





Post-Op Diagnosis:


same, metastasis to liver





Procedure Performed:


laparoscopic sigmoid colectomy, liver biopsy





Surgeon:


Odin Levine





Anesthesia Type:


GETA plus local





Blood Loss:


50





Specimans Obtained:


sigmoid colon, liver biopsy





Findings:


left lobe of the liver with whitish mass, c/w metastatic adenocarcinoma by 

frozen section, moderate size mass in low sigmoid colon, no obvious metastatic 

disease anywhere else





 OPERATIVE NOTE. 


OPERATIVE NOTE


Date:


Date:  Jul 16, 2019





Post-Op Diagnosis:


nl bladder;  hx colon ca





Procedure Performed:


cysto, pl ureteral caths'





Surgeon:


Kyung





Anesthesia Type:


Gen





Blood Loss:


min





Specimans Obtained:


neg





Findings:


nl bladder





Complications:


neg





Operative Note:


Under GA and in lith'y pos'n, IV abx admin'd





Cysto with 21 Scope was unrem.  UO's in nl pos'n.  5-Fr whstle tipped catheters 

were then threaded thru each ureter without diff.   The scope was removed prema  

tenorio was placed.  The ureteral cath's were then connected to the tenorio in usual

fashion.  A tenorio bag was the attached.   There were no complications.





Vitals


Vitals





Vital Signs








  Date Time  Temp Pulse Resp B/P (MAP) Pulse Ox O2 Delivery O2 Flow Rate FiO2


 


7/20/19 09:46      Room Air  


 


7/20/19 07:00 98.8 78 16 116/61 (79) 96   





 98.8       











Physical Exam


General:  Alert, Oriented X3, Cooperative, No acute distress (and clean dry and 

intact)


Heart:  Regular rate, Normal S1, No murmurs


Lungs:  Clear


Abdomen:  Normal bowel sounds, Soft, No tenderness, Other


Extremities:  No clubbing, No cyanosis, No edema


Skin:  No rashes


Brief Hospital Course


Mr. Carrell  is a 44 old [sex] who presented with [ colon cancer resection]


CONDITION AT DISCHARGE:  Improved


Discharge Medications





Current Medications


Ondansetron HCl (Zofran) 4 mg PRN Q6HRS  PRN IV NAUSEA/VOMITING;  Start 7/16/19 

at 07:00;  Stop 7/17/19 at 06:59;  Status DC


Fentanyl Citrate (Fentanyl 2ml Vial) 25 mcg PRN Q5MIN  PRN IV MILD PAIN 1-3;  

Start 7/16/19 at 07:00;  Stop 7/17/19 at 06:59;  Status DC


Fentanyl Citrate (Fentanyl 2ml Vial) 50 mcg PRN Q5MIN  PRN IV MODERATE TO SEVERE

PAIN;  Start 7/16/19 at 07:00;  Stop 7/17/19 at 06:59;  Status DC


Morphine Sulfate (Morphine Sulfate) 1 mg PRN Q10MIN  PRN IV SEVERE PAIN 7-10 

Last administered on 7/16/19at 11:50;  Start 7/16/19 at 07:00;  Stop 7/17/19 at 

06:59;  Status DC


Ringer's Solution 1,000 ml @  30 mls/hr Q24H IV  Last administered on 7/16/19at 

11:33;  Start 7/16/19 at 07:00;  Stop 7/16/19 at 18:59;  Status DC


Hydromorphone HCl (Dilaudid) 0.5 mg PRN Q10MIN  PRN IV SEV PAIN, Second choice; 

Start 7/16/19 at 07:00;  Stop 7/17/19 at 06:59;  Status DC


Prochlorperazine Edisylate (Compazine) 5 mg PACU PRN  PRN IV NAUSEA, MRX1 Last 

administered on 7/16/19at 11:50;  Start 7/16/19 at 07:00;  Stop 7/17/19 at 

06:59;  Status DC


Cefoxitin Sodium (Mefoxin) 2 gm PREOP  1X IVP  Last administered on 7/16/19at 

08:32;  Start 7/16/19 at 06:00;  Stop 7/16/19 at 09:41;  Status DC


Bupivacaine HCl/ Epinephrine Bitart (Sensorcain-Mpf Epi 0.5%-1:184859) 30 ml 

STK-MED ONCE .ROUTE  Last administered on 7/16/19at 09:02;  Start 7/16/19 at 

06:31;  Stop 7/16/19 at 07:32;  Status DC


Iohexol (Omnipaque 300 Mg/ml) 50 ml STK-MED ONCE .ROUTE ;  Start 7/16/19 at 

06:33;  Stop 7/16/19 at 07:33;  Status DC


Lidocaine HCl (Glydo (Lidocaine) Jelly) 6 rene STK-MED ONCE .ROUTE ;  Start 

7/16/19 at 06:33;  Stop 7/16/19 at 07:34;  Status DC


Lidocaine HCl (Glydo (Lidocaine) Jelly) 6 rene STK-MED ONCE .ROUTE ;  Start 

7/16/19 at 06:34;  Stop 7/16/19 at 06:35;  Status Cancel


Lidocaine HCl (Glydo (Lidocaine) Jelly) 6 rene STK-MED ONCE .ROUTE ;  Start 

7/16/19 at 06:40;  Stop 7/16/19 at 07:40;  Status DC


Cefoxitin Sodium (Mefoxin) 2 gm 1X  ONCE IVP  Last administered on 7/16/19at 

10:05;  Start 7/16/19 at 10:00;  Stop 7/16/19 at 10:01;  Status DC


Enoxaparin Sodium (Lovenox 40mg Syringe) 40 mg Q24H SQ  Last administered on 

7/18/19at 22:27;  Start 7/16/19 at 22:00;  Stop 7/20/19 at 13:19;  Status DC


Sodium Chloride (Normal Saline Flush) 3 ml QSHIFT  PRN IV AFTER MEDS AND BLOOD 

DRAWS;  Start 7/16/19 at 11:45;  Stop 7/20/19 at 13:19;  Status DC


Ringer's Solution 1,000 ml @  50 mls/hr Q20H IV  Last administered on 7/19/19at 

06:19;  Start 7/16/19 at 11:35;  Stop 7/19/19 at 12:02;  Status DC


Naloxone HCl (Narcan) 0.4 mg PRN Q2MIN  PRN IV SEE INSTRUCTIONS;  Start 7/16/19 

at 11:45;  Stop 7/20/19 at 13:19;  Status DC


Sodium Chloride 1,000 ml @  25 mls/hr Q24H IV ;  Start 7/16/19 at 11:35;  Stop 

7/17/19 at 07:31;  Status DC


Morphine Sulfate 30 ml @ 0 mls/hr CONT PRN  PRN IV PER PROTOCOL Last 

administered on 7/18/19at 14:56;  Start 7/16/19 at 11:45;  Stop 7/19/19 at 

09:23;  Status DC


Ondansetron HCl (Zofran) 4 mg PRN Q6HRS  PRN IV NAUESA, 1ST CHOICE;  Start 

7/16/19 at 11:45;  Stop 7/20/19 at 13:19;  Status DC


Dexamethasone Sodium Phosphate (Decadron) 4 mg STK-MED ONCE .ROUTE ;  Start 

7/16/19 at 07:47;  Stop 7/17/19 at 19:06;  Status DC


Propofol 20 ml @ As Directed STK-MED ONCE IV ;  Start 7/16/19 at 07:47;  Stop 

7/17/19 at 19:06;  Status DC


Lidocaine HCl (Lidocaine Pf 2% Vial) 5 ml STK-MED ONCE .ROUTE ;  Start 7/16/19 

at 07:47;  Stop 7/17/19 at 19:06;  Status DC


Ondansetron HCl (Zofran) 4 mg STK-MED ONCE .ROUTE ;  Start 7/16/19 at 07:47;  

Stop 7/17/19 at 19:06;  Status DC


Midazolam HCl (Versed) 2 mg STK-MED ONCE .ROUTE ;  Start 7/16/19 at 07:47;  Stop

7/17/19 at 19:06;  Status DC


Fentanyl Citrate (Fentanyl 2ml Vial) 100 mcg STK-MED ONCE .ROUTE ;  Start 

7/16/19 at 07:47;  Stop 7/17/19 at 19:06;  Status DC


Rocuronium Bromide (Zemuron) 50 mg STK-MED ONCE .ROUTE ;  Start 7/16/19 at 

07:47;  Stop 7/17/19 at 19:06;  Status DC


Fentanyl Citrate (Fentanyl 2ml Vial) 100 mcg STK-MED ONCE .ROUTE ;  Start 7/16 /19 at 08:26;  Stop 7/17/19 at 19:06;  Status DC


Rocuronium Bromide (Zemuron) 100 mg STK-MED ONCE .ROUTE ;  Start 7/16/19 at 09:

07;  Stop 7/17/19 at 19:06;  Status DC


Ketamine HCl (Ketamine) 50 mg STK-MED ONCE .ROUTE ;  Start 7/16/19 at 09:35;  

Stop 7/17/19 at 19:06;  Status DC


Glycopyrrolate (Robinul) 1 mg STK-MED ONCE .ROUTE ;  Start 7/16/19 at 09:55;  

Stop 7/17/19 at 19:06;  Status DC


Neostigmine Methylsulfate (Neostigmine Methylsulfate) 5 mg STK-MED ONCE .ROUTE ;

 Start 7/16/19 at 09:55;  Stop 7/17/19 at 19:06;  Status DC


Hydromorphone HCl (Dilaudid) 2 mg STK-MED ONCE .ROUTE ;  Start 7/16/19 at 10:49;

 Stop 7/17/19 at 19:06;  Status DC


Sevoflurane (Ultane) 90 ml STK-MED ONCE IH ;  Start 7/16/19 at 11:01;  Stop 

7/17/19 at 19:06;  Status DC


Ketorolac Tromethamine (Toradol For Or Only) 30 mg STK-MED ONCE INJ ;  Start 

7/16/19 at 11:03;  Stop 7/17/19 at 19:06;  Status DC


Prochlorperazine Edisylate (Compazine) 10 mg STK-MED ONCE .ROUTE ;  Start 

7/16/19 at 11:08;  Stop 7/17/19 at 19:06;  Status DC


Oxycodone/ Acetaminophen (Percocet 5/325) 1 tab PRN Q4HRS  PRN PO MODERATE PAIN;

 Start 7/19/19 at 09:30;  Stop 7/20/19 at 13:19;  Status DC


Morphine Sulfate (Morphine Sulfate) 2 mg PRN Q4HRS  PRN IV PAIN;  Start 7/19/19 

at 09:30;  Stop 7/20/19 at 13:19;  Status DC


Oxycodone/ Acetaminophen (Percocet 5/325) 2 tab PRN Q4HRS  PRN PO SEVERE PAIN 

Last administered on 7/20/19at 11:24;  Start 7/19/19 at 09:30;  Stop 7/20/19 at 

13:19;  Status DC


Diphenhydramine HCl (Benadryl) 25 mg PRN QHS  PRN PO INSOMNIA;  Start 7/19/19 at

13:15;  Stop 7/20/19 at 13:19;  Status DC


Potassium Chloride (Klor-Con) 40 meq 1X  ONCE PO  Last administered on 7/19/19at

15:39;  Start 7/19/19 at 14:30;  Stop 7/19/19 at 14:31;  Status DC


Potassium Chloride (Klor-Con) 20 meq DAILYWBKFT PO  Last administered on 

7/20/19at 09:43;  Start 7/20/19 at 08:00;  Stop 7/20/19 at 13:19;  Status DC





Active Scripts


Active


Percocet 5-325 Mg Tablet ** (Oxycodone/Acetaminophen) 1 Each Tablet 1-2 Tab PO 

PRN Q4HRS PRN


Reported


Metamucil (Psyllium Husk) 0.52 Gm Capsule 0.52 Gm PO DAILY 1 Days


Docusate Sodium 100 Mg Capsule 1 Cap PO DAILY


Vital Signs





Vital Signs








  Date Time  Temp Pulse Resp B/P (MAP) Pulse Ox O2 Delivery O2 Flow Rate FiO2


 


7/20/19 11:24      Room Air  


 


7/20/19 11:00 98.4 76 16 107/61 (76) 95   





 98.4       








Labs





Laboratory Tests








Test


 7/19/19


04:50 7/20/19


04:30


 


White Blood Count


 5.8 x10^3/uL


(4.0-11.0) 5.4 x10^3/uL


(4.0-11.0)


 


Red Blood Count


 4.40 x10^6/uL


(4.30-5.70) 4.44 x10^6/uL


(4.30-5.70)


 


Hemoglobin


 11.5 g/dL


(13.0-17.5) 11.9 g/dL


(13.0-17.5)


 


Hematocrit


 35.0 %


(39.0-53.0) 35.5 %


(39.0-53.0)


 


Mean Corpuscular Volume 79 fL ()  80 fL () 


 


Mean Corpuscular Hemoglobin 26 pg (25-35)  27 pg (25-35) 


 


Mean Corpuscular Hemoglobin


Concent 33 g/dL


(31-37) 33 g/dL


(31-37)


 


Red Cell Distribution Width


 14.1 %


(11.5-14.5) 14.3 %


(11.5-14.5)


 


Platelet Count


 237 x10^3/uL


(140-400) 248 x10^3/uL


(140-400)


 


Neutrophils (%) (Auto) 62 % (31-73)  51 % (31-73) 


 


Lymphocytes (%) (Auto) 22 % (24-48)  32 % (24-48) 


 


Monocytes (%) (Auto) 13 % (0-9)  12 % (0-9) 


 


Eosinophils (%) (Auto) 2 % (0-3)  5 % (0-3) 


 


Basophils (%) (Auto) 1 % (0-3)  1 % (0-3) 


 


Neutrophils # (Auto)


 3.6 x10^3/uL


(1.8-7.7) 2.8 x10^3/uL


(1.8-7.7)


 


Lymphocytes # (Auto)


 1.3 x10^3/uL


(1.0-4.8) 1.7 x10^3/uL


(1.0-4.8)


 


Monocytes # (Auto)


 0.7 x10^3/uL


(0.0-1.1) 0.7 x10^3/uL


(0.0-1.1)


 


Eosinophils # (Auto)


 0.1 x10^3/uL


(0.0-0.7) 0.3 x10^3/uL


(0.0-0.7)


 


Basophils # (Auto)


 0.1 x10^3/uL


(0.0-0.2) 0.0 x10^3/uL


(0.0-0.2)


 


Sodium Level


 139 mmol/L


(136-145) 142 mmol/L


(136-145)


 


Potassium Level


 3.4 mmol/L


(3.5-5.1) 3.8 mmol/L


(3.5-5.1)


 


Chloride Level


 102 mmol/L


() 105 mmol/L


()


 


Carbon Dioxide Level


 29 mmol/L


(21-32) 29 mmol/L


(21-32)


 


Anion Gap 8 (6-14)  8 (6-14) 


 


Blood Urea Nitrogen 7 mg/dL (8-26)  7 mg/dL (8-26) 


 


Creatinine


 0.9 mg/dL


(0.7-1.3) 1.0 mg/dL


(0.7-1.3)


 


Estimated GFR


(Cockcroft-Gault) 91.7 


 81.2 





 


Glucose Level


 96 mg/dL


(70-99) 93 mg/dL


(70-99)


 


Calcium Level


 8.6 mg/dL


(8.5-10.1) 8.9 mg/dL


(8.5-10.1)








Laboratory Tests








Test


 7/20/19


04:30


 


White Blood Count


 5.4 x10^3/uL


(4.0-11.0)


 


Red Blood Count


 4.44 x10^6/uL


(4.30-5.70)


 


Hemoglobin


 11.9 g/dL


(13.0-17.5)


 


Hematocrit


 35.5 %


(39.0-53.0)


 


Mean Corpuscular Volume 80 fL () 


 


Mean Corpuscular Hemoglobin 27 pg (25-35) 


 


Mean Corpuscular Hemoglobin


Concent 33 g/dL


(31-37)


 


Red Cell Distribution Width


 14.3 %


(11.5-14.5)


 


Platelet Count


 248 x10^3/uL


(140-400)


 


Neutrophils (%) (Auto) 51 % (31-73) 


 


Lymphocytes (%) (Auto) 32 % (24-48) 


 


Monocytes (%) (Auto) 12 % (0-9) 


 


Eosinophils (%) (Auto) 5 % (0-3) 


 


Basophils (%) (Auto) 1 % (0-3) 


 


Neutrophils # (Auto)


 2.8 x10^3/uL


(1.8-7.7)


 


Lymphocytes # (Auto)


 1.7 x10^3/uL


(1.0-4.8)


 


Monocytes # (Auto)


 0.7 x10^3/uL


(0.0-1.1)


 


Eosinophils # (Auto)


 0.3 x10^3/uL


(0.0-0.7)


 


Basophils # (Auto)


 0.0 x10^3/uL


(0.0-0.2)


 


Sodium Level


 142 mmol/L


(136-145)


 


Potassium Level


 3.8 mmol/L


(3.5-5.1)


 


Chloride Level


 105 mmol/L


()


 


Carbon Dioxide Level


 29 mmol/L


(21-32)


 


Anion Gap 8 (6-14) 


 


Blood Urea Nitrogen 7 mg/dL (8-26) 


 


Creatinine


 1.0 mg/dL


(0.7-1.3)


 


Estimated GFR


(Cockcroft-Gault) 81.2 





 


Glucose Level


 93 mg/dL


(70-99)


 


Calcium Level


 8.9 mg/dL


(8.5-10.1)








Allergies





                                    Allergies








Coded Allergies Type Severity Reaction Last Updated Verified


 


  No Known Drug Allergies    7/16/19 No








Disposition/Orders:  D/C to Home


Patient Instructions


d/c planning 25 min











MARIA D URIBE MD          Jul 20, 2019 14:11

## 2019-07-20 NOTE — NUR
Discharge instructions reviewed with pt, rx given to pt. Answered questions and concerns. Pt 
dc home accompanied by family member, belonging with pt at time of dc.

## 2019-08-09 ENCOUNTER — HOSPITAL ENCOUNTER (OUTPATIENT)
Dept: HOSPITAL 61 - SURG | Age: 44
Discharge: HOME | End: 2019-08-09
Attending: SURGERY
Payer: COMMERCIAL

## 2019-08-09 VITALS
SYSTOLIC BLOOD PRESSURE: 120 MMHG | DIASTOLIC BLOOD PRESSURE: 70 MMHG | DIASTOLIC BLOOD PRESSURE: 70 MMHG | SYSTOLIC BLOOD PRESSURE: 120 MMHG

## 2019-08-09 VITALS — WEIGHT: 237 LBS | BODY MASS INDEX: 29.47 KG/M2 | HEIGHT: 75 IN

## 2019-08-09 DIAGNOSIS — Z90.49: ICD-10-CM

## 2019-08-09 DIAGNOSIS — C18.9: ICD-10-CM

## 2019-08-09 DIAGNOSIS — Z45.2: Primary | ICD-10-CM

## 2019-08-09 DIAGNOSIS — Z72.89: ICD-10-CM

## 2019-08-09 DIAGNOSIS — C78.7: ICD-10-CM

## 2019-08-09 DIAGNOSIS — Z87.891: ICD-10-CM

## 2019-08-09 PROCEDURE — 36556 INSERT NON-TUNNEL CV CATH: CPT

## 2019-08-09 PROCEDURE — 77001 FLUOROGUIDE FOR VEIN DEVICE: CPT

## 2019-08-09 PROCEDURE — 36561 INSERT TUNNELED CV CATH: CPT

## 2019-08-09 PROCEDURE — C1788 PORT, INDWELLING, IMP: HCPCS

## 2019-08-09 NOTE — DISCH
DISCHARGE INSTRUCTIONS


Condition on Discharge


Condition on Discharge:  Stable





Activity After Discharge


Activity Instructions for Disc:  Activity as tolerated


Other activity instructions:  May shower in 24 hours





Diet after Discharge


Diet after Discharge:  Regular





Wound Incision Care


Other wound/incision instructi:  May shower in 24 hours





Contacting the DRMary after DC


Call your doctor for:  If your condition worsens





Follow-Up


Follow up with:  Dr. Pisano in 2 weeks





Treatment/Equipment after DC


Adaptive Equipment Issued:  None











MARIA D PISANO MD              Aug 9, 2019 15:09

## 2019-08-09 NOTE — PDOC4
Operative Note


Operative Note


Date: 8/9/2019


Preoperative diagnosis: Colon cancer with metastatic disease to the liver


Postoperative diagnosis: Same


Procedure: Left chest Port-A-Cath placement


Surgeon: Pranav


Specimen: None


Dictation: Patient is a 44-year-old gentleman recently diagnosed with colon 

cancer metastatic disease to liver and underwent colon resection and is now 

needing long-term venous access for chemotherapy. Procedure for Port-A-Cath 

placement was explained to the patient detail risk benefits were also discussed 

including bleeding infection possible pneumothorax alternatives to this proc

edure also discussed with patient seemed to understand and gave both verbal and 

written consent to have the procedure performed. Patient was taken to the 

operating room placed in supine position general anesthesia was initiated once 

patient was sleep and intubated his chest and neck were prepped and draped usual

sterile fashion using ChloraPrep. An area over the left deltopectoral groove was

injected with quarter percent Marcaine and incision was made over the 

deltopectoral groove with a 15 blade scalpel was carried down through the 

subcutaneous tissue using electrocautery divided hemostasis until the cephalic 

vein was visualized this was controlled proximally and distally with Vicryl 

sutures the vein was partially opened with 11 blade scalpel and a Salinger wire 

was placed in the vein under fluoroscopy this was tracked to the superior vena 

cava down towards the right atrium. At this point a peel-away dilator was placed

over the wire the dilator was removed as well as the wire and the Port-A-Cath 

catheter was placed through the peel-away peel-away was then removed fluoroscopy

was used again to showed the tip of the catheter in the superior Vena cava. Port

was placed on the back end of the catheter this was sewn into place chest wall 

pocket using 30 Prolenes. The port was accessed with hep flush there was good 

return of blood was easily flushed hep lock was then used to lock the catheter 

with 1-1/2 mL of 1000 units per ml solution. Wound was then closed in 2 layers 

deep layer running 3-0 Vicryl and the skin was approximate for septic and a 

Monocryl Mastisol Steri-Strips and island dressing were applied. Patient was 

awakened and asked bated operating room taken to recovery in stable condition 

all sponge instrument needle counts listed as correct estimate blood loss 5 mL.











MARIA D PISANO MD              Aug 9, 2019 15:07

## 2019-11-01 ENCOUNTER — HOSPITAL ENCOUNTER (OUTPATIENT)
Dept: HOSPITAL 61 - CT | Age: 44
Discharge: HOME | End: 2019-11-01
Attending: INTERNAL MEDICINE
Payer: COMMERCIAL

## 2019-11-01 DIAGNOSIS — R91.1: ICD-10-CM

## 2019-11-01 DIAGNOSIS — F17.200: ICD-10-CM

## 2019-11-01 DIAGNOSIS — C18.5: Primary | ICD-10-CM

## 2019-11-01 DIAGNOSIS — M51.36: ICD-10-CM

## 2019-11-01 DIAGNOSIS — E11.9: ICD-10-CM

## 2019-11-01 DIAGNOSIS — K76.9: ICD-10-CM

## 2019-11-01 PROCEDURE — 74177 CT ABD & PELVIS W/CONTRAST: CPT

## 2019-11-01 PROCEDURE — 71260 CT THORAX DX C+: CPT

## 2019-11-01 NOTE — RAD
CT CHEST ABD PELVIS W/CONTRAST 

 

Indication: Malignant neoplasm of the splenic flexure

 

Technique: Postcontrast CT imaging was performed of the chest, abdomen, 

pelvis, multiplanar reconstruction images submitted. Oral contrast was 

also given. One or more of the following individualized dose reduction 

techniques were utilized for this examination:  1. Automated exposure 

control  2. Adjustment of the mA and/or kV according to patient size  3. 

Use of iterative reconstruction technique.

 

Comparison: June 21, 2019

 

CHEST:

 

Findings:  

There is left port catheter. There is no new pleural or pericardial fluid,

pneumothorax, infiltrate, suspicious pulmonary nodularity. Tiny subpleural

right upper lobe nodule about 0.2 cm image 32 is stable. No new 

significantly enlarged nodes are identified of the chest.

 

IMPRESSION:

1.  There is no new CT evidence of metastatic disease to the chest.

 

Abdomen pelvis

 

FINDINGS: There is a focus of vague hypodensity of the right lobe of liver

closer to the dome about 0.9 cm transverse by 0.8 cm AP by 0.9 cm CC image

9 apparently smaller as previously on the order of about 2 cm transverse 

by 1.8 cm AP by 1.7 cm CC. Previously seen hypodense lesion of the left 

lobe of the liver also is less apparent, difficult to measure estimated 

about 1.4 cm transverse by 1.3 cm AP by 0.9 cm CC versus previously 2.2 cm

transverse by 1.6 cm AP by 1.4 cm CC. No new liver lesion is identified. 

There is no adrenal nodularity. Both kidneys enhance, no hydronephrosis. 

Gallbladder is present. No new focal abnormality is identified of the 

pancreas or spleen. Bowel is not significantly dilated. No new 

significantly enlarged mesenteric nodes are identified, some small nodes 

present. There is no free fluid or free air. There is L4-5 degenerative 

disc disease.

 

IMPRESSION:

 

1. No new significant lymphadenopathy is identified. There are some 

persistent although smaller vague hypodense liver lesions, no new liver 

lesion or adrenal mass. 

 

Electronically signed by: Sha Olivares MD (11/1/2019 5:04 PM) 

[id number]

## 2019-11-19 ENCOUNTER — HOSPITAL ENCOUNTER (OUTPATIENT)
Dept: HOSPITAL 61 - US | Age: 44
Discharge: HOME | End: 2019-11-19
Attending: SURGERY
Payer: COMMERCIAL

## 2019-11-19 VITALS
DIASTOLIC BLOOD PRESSURE: 95 MMHG | SYSTOLIC BLOOD PRESSURE: 140 MMHG | SYSTOLIC BLOOD PRESSURE: 140 MMHG | DIASTOLIC BLOOD PRESSURE: 95 MMHG | DIASTOLIC BLOOD PRESSURE: 95 MMHG | SYSTOLIC BLOOD PRESSURE: 140 MMHG | SYSTOLIC BLOOD PRESSURE: 140 MMHG | DIASTOLIC BLOOD PRESSURE: 95 MMHG | DIASTOLIC BLOOD PRESSURE: 95 MMHG | DIASTOLIC BLOOD PRESSURE: 95 MMHG | SYSTOLIC BLOOD PRESSURE: 140 MMHG | SYSTOLIC BLOOD PRESSURE: 140 MMHG

## 2019-11-19 DIAGNOSIS — C18.9: Primary | ICD-10-CM

## 2019-11-19 PROCEDURE — 76705 ECHO EXAM OF ABDOMEN: CPT

## 2019-11-19 NOTE — RAD
EXAM: Limited abdominal ultrasound.

 

HISTORY: Hepatic metastatic disease.

 

COMPARISON: CT 11/01/2019.

 

FINDINGS: Intraoperative ultrasound of the liver was performed with the 

transducer at the hepatic capsule. No lesions are identified on real-time 

scanning or still images. The sites of concern in segment 8, 7 and 2 have 

no correlate on this study. Hepatic parenchymal echotexture is 

unremarkable.

 

IMPRESSION: 

1. No hepatic lesions are identified sonographically.

 

Electronically signed by: MIA Ravi MD (11/19/2019 4:02 PM) 

[facility]

## 2020-03-16 ENCOUNTER — HOSPITAL ENCOUNTER (OUTPATIENT)
Dept: HOSPITAL 61 - CT | Age: 45
Discharge: HOME | End: 2020-03-16
Attending: INTERNAL MEDICINE
Payer: COMMERCIAL

## 2020-03-16 DIAGNOSIS — R91.8: ICD-10-CM

## 2020-03-16 DIAGNOSIS — C18.5: Primary | ICD-10-CM

## 2020-03-16 DIAGNOSIS — K76.89: ICD-10-CM

## 2020-03-16 DIAGNOSIS — Z90.49: ICD-10-CM

## 2020-03-16 PROCEDURE — 71260 CT THORAX DX C+: CPT

## 2020-03-16 PROCEDURE — 74177 CT ABD & PELVIS W/CONTRAST: CPT

## 2020-03-16 NOTE — RAD
CT study of the chest and abdomen and pelvis with contrast

 

Clinical indications: Malignant neoplasm of the splenic flexure. Follow-up

study.

 

COMPARISON: November 1, 2019.

 

TECHNIQUE: After IV infusion of 75 cc of Omnipaque 300, helical CT 

scanning of the chest and abdomen and pelvis was performed. GI contrast 

was administered per mouth.

 

 

PQRS compliance Statement

 

One or more of the following individualized dose reduction techniques were

utilized for this study:

1.  Automated exposure control

2.  Adjustment of the mA and/or kV according to patient size

3.  Use of iterative reconstruction technique

 

CHEST CT: No focal aneurysmal dilatation of the thoracic aorta is seen. 

The heart size is normal and no pericardial effusion is seen. No enlarged 

thoracic lymphadenopathy is evident. Calcified left hilar nodes are seen 

due to old granulomatous disease. Tiny stable subpleural nodule seen 

within the anterolateral aspect of the right upper lobe seen on series 2 

and image 30. Tiny anterior nodule seen within the left upper lobe on 

series 2 and image 32 is stable. Otherwise no new lung nodules are seen. 

No new lung infiltrate is seen. No pleural effusion or pneumothorax is 

evident. The proximal bronchial tree is patent. No lytic process is seen.

 

IMPRESSION: No acute abnormality. No new lung nodule lung infiltrate.

 

ABDOMEN AND PELVIS CT: The previously seen lesion within the dome of the 

right lobe liver is barely visible today seen on series 4 and image 11. It

measures 5 mm in today's study. Previously seen left lobe liver lesion is 

not visible today. No new lesions are seen within the liver. The spleen 

measures 13.4 cm in length which is at the upper limits of normal. The 

pancreas is normal. The gallbladder is now surgically absent with a 

surgical clip present. There is a small postoperative fluid collection 

within the gallbladder fossa measuring 2.5 cm. No extrahepatic biliary 

ductal dilatation is seen. No adrenal mass is evident. Both kidneys are 

normal without hydronephrosis or hydroureter. Urinary bladder wall is 

smooth. No focal aneurysmal dilatation of the abdominal aorta is seen. No 

enlarged abdominal or pelvic lymphadenopathy is seen. There is moderate 

fecal retention throughout the rectosigmoid region. There is mild 

retention throughout the rest of the colon. The terminal ileum is 

unremarkable. The appendix is normal. No obstructive bowel pattern is 

seen. No free air or free fluid or mesenteric edema is seen. No lytic 

process is seen.

 

IMPRESSION: 2.5 cm postoperative fluid collection within the gallbladder 

fossa after a cholecystectomy.

 

Previously seen lesion within the dome of the right lobe liver is barely 

visible today measuring only 5 mm. No lesion of the left lobe of the liver

is seen today. No new liver lesions are evident.

 

Electronically signed by: Rusty Porter MD (3/16/2020 1:13 PM) [facility]

## 2020-05-04 ENCOUNTER — HOSPITAL ENCOUNTER (OUTPATIENT)
Dept: HOSPITAL 61 - CT | Age: 45
Discharge: HOME | End: 2020-05-04
Attending: INTERNAL MEDICINE
Payer: COMMERCIAL

## 2020-05-04 DIAGNOSIS — N13.30: ICD-10-CM

## 2020-05-04 DIAGNOSIS — C18.5: Primary | ICD-10-CM

## 2020-05-04 DIAGNOSIS — R33.8: ICD-10-CM

## 2020-05-04 DIAGNOSIS — N32.89: ICD-10-CM

## 2020-05-04 PROCEDURE — 71260 CT THORAX DX C+: CPT

## 2020-05-04 PROCEDURE — 74177 CT ABD & PELVIS W/CONTRAST: CPT

## 2020-05-04 NOTE — RAD
EXAM: CT Chest, Abdomen, and Pelvis with IV contrast

 

INDICATION: Malignant neoplasm of splenic flexure.

 

TECHNIQUE:  Multi-detector row CT images were acquired from the thoracic 

inlet through the ischial tuberosities with the use of IV contrast. 

Sagittal and coronal images were acquired from the transaxial data. All CT

scans performed at this facility utilize dose optimization techniques as 

appropriate to the exam, including the following: Automated exposure 

control and adjustment of the mA and/or KV according to patient size (this

includes techniques or standardized protocols for targeted exams where 

dose is indication/reason for exam).

 

IV CONTRAST: Administered

 

ORAL CONTRAST: Administered

 

COMPARISON: Chest abdomen pelvis CT 3/16/2020 and June 21, 2019

 

FINDINGS: 

 

CHEST:

CARDIOVASCULAR:  Left subclavian tunneled chest port has tip terminating 

in the mid SVC.

 

MEDIASTINUM & CHENG: No adenopathy or masses.

 

LUNGS: No pulmonary infiltrate, nodule, or other focal abnormality.

 

PLEURAL SPACE: No pleural effusions or pneumothorax.

 

OSSEOUS & SOFT TISSUE: Unremarkable

 

ABDOMEN/PELVIS:

LIVER:  Further decrease conspicuity of the right hepatic segment 8 

hypodense lesion, unchanged at 5 mm diameter. Stable 2.4 cm ovoid 

structure abutting the gallbladder fossa. No new hepatic lesions.

 

BILIARY SYSTEM: Gallbladder is surgically absent.. Bile ducts are not 

dilated.

 

PANCREAS:  Unremarkable

 

SPLEEN:  Unremarkable

 

ADRENALS:  Unremarkable

 

KIDNEYS & URETERS:  Mild right greater than left bilateral 

hydronephrosis. No urinary tract stones.

 

BLADDER:  Distended up to 15 cm in craniocaudal extent.

 

REPRODUCTIVE ORGANS:  Unremarkable. The prostate measures 4.5 cm diameter.

 

GASTROINTESTINAL: No recurrence of the sigmoid colonic mass or other 

evidence of a colonic mass. The enteric contrast opacifies the colon up to

the mid transverse colon. No evidence of bowel obstruction, perforation or

acute inflammation. The appendix is normal.

 

MESENTERY/PERITONEUM/RETROPERITONEUM: Unremarkable

 

VASCULAR:  Unremarkable

 

LYMPH NODES:  No adenopathy

 

OSSEOUS & SOFT TISSUES:  Unremarkable

 

IMPRESSION:

 

1. No evidence of disease recurrence with stable posttreatment changes in 

the liver and sigmoid colon.

2. There is evidence of urinary retention with a distended urinary bladder

and bilateral mild hydronephrosis.

 

Electronically signed by: Sage Crystal MD (5/4/2020 12:34 PM) 

[id number]

## 2020-08-28 ENCOUNTER — HOSPITAL ENCOUNTER (OUTPATIENT)
Dept: HOSPITAL 61 - CT | Age: 45
Discharge: HOME | End: 2020-08-28
Attending: INTERNAL MEDICINE
Payer: COMMERCIAL

## 2020-08-28 DIAGNOSIS — K76.9: ICD-10-CM

## 2020-08-28 DIAGNOSIS — C18.5: Primary | ICD-10-CM

## 2020-08-28 LAB
ALBUMIN SERPL-MCNC: 3.6 G/DL (ref 3.4–5)
ALBUMIN/GLOB SERPL: 0.9 {RATIO} (ref 1–1.7)
ALP SERPL-CCNC: 64 U/L (ref 46–116)
ALT SERPL-CCNC: 27 U/L (ref 16–63)
ANION GAP SERPL CALC-SCNC: 8 MMOL/L (ref 6–14)
AST SERPL-CCNC: 23 U/L (ref 15–37)
BASOPHILS # BLD AUTO: 0.1 X10^3/UL (ref 0–0.2)
BASOPHILS NFR BLD: 1 % (ref 0–3)
BILIRUB SERPL-MCNC: 1.3 MG/DL (ref 0.2–1)
BUN SERPL-MCNC: 15 MG/DL (ref 8–26)
BUN/CREAT SERPL: 13 (ref 6–20)
CALCIUM SERPL-MCNC: 8.8 MG/DL (ref 8.5–10.1)
CHLORIDE SERPL-SCNC: 104 MMOL/L (ref 98–107)
CO2 SERPL-SCNC: 28 MMOL/L (ref 21–32)
CREAT SERPL-MCNC: 1.2 MG/DL (ref 0.7–1.3)
EOSINOPHIL NFR BLD: 0.1 X10^3/UL (ref 0–0.7)
EOSINOPHIL NFR BLD: 1 % (ref 0–3)
ERYTHROCYTE [DISTWIDTH] IN BLOOD BY AUTOMATED COUNT: 14.6 % (ref 11.5–14.5)
GFR SERPLBLD BASED ON 1.73 SQ M-ARVRAT: 65.5 ML/MIN
GLOBULIN SER-MCNC: 4.1 G/DL (ref 2.2–3.8)
GLUCOSE SERPL-MCNC: 84 MG/DL (ref 70–99)
HCT VFR BLD CALC: 42.2 % (ref 39–53)
HGB BLD-MCNC: 14.5 G/DL (ref 13–17.5)
LYMPHOCYTES # BLD: 1.9 X10^3/UL (ref 1–4.8)
LYMPHOCYTES NFR BLD AUTO: 29 % (ref 24–48)
MCH RBC QN AUTO: 29 PG (ref 25–35)
MCHC RBC AUTO-ENTMCNC: 34 G/DL (ref 31–37)
MCV RBC AUTO: 84 FL (ref 79–100)
MONO #: 0.7 X10^3/UL (ref 0–1.1)
MONOCYTES NFR BLD: 11 % (ref 0–9)
NEUT #: 3.8 X10^3/UL (ref 1.8–7.7)
NEUTROPHILS NFR BLD AUTO: 58 % (ref 31–73)
PLATELET # BLD AUTO: 185 X10^3/UL (ref 140–400)
POTASSIUM SERPL-SCNC: 3.7 MMOL/L (ref 3.5–5.1)
PROT SERPL-MCNC: 7.7 G/DL (ref 6.4–8.2)
RBC # BLD AUTO: 5.05 X10^6/UL (ref 4.3–5.7)
SODIUM SERPL-SCNC: 140 MMOL/L (ref 136–145)
WBC # BLD AUTO: 6.6 X10^3/UL (ref 4–11)

## 2020-08-28 PROCEDURE — 85025 COMPLETE CBC W/AUTO DIFF WBC: CPT

## 2020-08-28 PROCEDURE — 74177 CT ABD & PELVIS W/CONTRAST: CPT

## 2020-08-28 PROCEDURE — 80053 COMPREHEN METABOLIC PANEL: CPT

## 2020-08-28 PROCEDURE — 82378 CARCINOEMBRYONIC ANTIGEN: CPT

## 2020-08-28 PROCEDURE — 71260 CT THORAX DX C+: CPT

## 2020-08-28 NOTE — RAD
CT CHEST ABD PELVIS W/CONTRAST 

 

Indication: Splenic flexure cancer

 

Technique: Postcontrast CT imaging was performed of the chest, abdomen, 

pelvis, multiplanar reconstruction images submitted. Oral contrast was 

also given. One or more of the following individualized dose reduction 

techniques were utilized for this examination:  1. Automated exposure 

control  2. Adjustment of the mA and/or kV according to patient size  3. 

Use of iterative reconstruction technique.

 

Comparison: May 4, 2020; March 16, 2020

 

CHEST:

 

Findings:  

There is again left subclavian catheter with the tip in the region vena 

cava. There is no new lung nodularity or chest lymphadenopathy. There is 

no new infiltrate, pleural or pericardial fluid, or pneumothorax.

 

IMPRESSION:

1.  There is no new CT evidence of metastatic disease to the chest.

 

Abdomen pelvis

 

FINDINGS: Approximately 1.9 cm lesion of the liver near the gallbladder 

fossa is stable to somewhat less prominent. Very subtle focus of 

hypodensity near the dome of liver is stable, almost non-apparent, 

measures about 0.5 cm coronal image 31 series 8. No new liver lesion is 

identified. There is no adrenal nodularity. No new abnormality is 

identified of the pancreas or spleen. There again has been 

cholecystectomy. Both kidneys enhance, no hydronephrosis. The bowel is not

dilated. There is more prominent retained stool in the sigmoid colon to 

the rectum. There is no free fluid or free air. No new significantly 

enlarged nodes are identified of the abdomen and pelvis.

 

 

IMPRESSION:

 

1.Tiny hypodense liver lesion is almost not apparent on this exam, no new 

liver lesion identified. Hypodense lesion of liver near the gallbladder 

fossa is stable to somewhat less prominent. There is no CT evidence of new

metastatic disease to the abdomen or pelvis.

2. There is retained stool greater of the sigmoid colon to the rectum.  

 

Electronically signed by: Sha Olivares MD (8/28/2020 5:13 PM) [facility]

## 2020-10-02 ENCOUNTER — HOSPITAL ENCOUNTER (OUTPATIENT)
Dept: HOSPITAL 61 - LAB | Age: 45
Discharge: HOME | End: 2020-10-02
Attending: SURGERY
Payer: COMMERCIAL

## 2020-10-02 DIAGNOSIS — Z20.828: ICD-10-CM

## 2020-10-02 DIAGNOSIS — Z01.812: Primary | ICD-10-CM

## 2020-10-06 ENCOUNTER — HOSPITAL ENCOUNTER (OUTPATIENT)
Dept: HOSPITAL 61 - SURG | Age: 45
Discharge: HOME | End: 2020-10-06
Attending: SURGERY
Payer: COMMERCIAL

## 2020-10-06 DIAGNOSIS — Z87.891: ICD-10-CM

## 2020-10-06 DIAGNOSIS — E11.9: ICD-10-CM

## 2020-10-06 DIAGNOSIS — Z03.89: Primary | ICD-10-CM

## 2020-10-06 PROCEDURE — 88300 SURGICAL PATH GROSS: CPT

## 2020-10-06 PROCEDURE — 36590 REMOVAL TUNNELED CV CATH: CPT

## 2020-10-06 NOTE — DISCH
DISCHARGE INSTRUCTIONS


Condition on Discharge


Condition on Discharge:  Stable





Activity After Discharge


Activity Instructions for Disc:  Activity as tolerated





Diet after Discharge


Diet after Discharge:  Regular





Wound Incision Care


Other wound/incision instructi:  May shower in 24 hours





Contacting the DRMary after DC


Call your doctor for:  If your condition worsens





Follow-Up


Follow up with:  Dr. Pisano as needed





Treatment/Equipment after DC


Adaptive Equipment Issued:  None











MARIA D PISANO MD              Oct 6, 2020 13:17

## 2020-10-06 NOTE — PDOC4
Operative Note


Operative Note


Date: 10/6/2020 at 1314


Preoperative diagnosis: Port-A-Cath in place


Postoperative diagnosis: Same


Procedure: Removal of Port-A-Cath


Surgeon: Pranav


Specimen: Port-A-Cath


Dictation: Patient is a 45-year-old gentleman is had a Port-A-Cath for 

chemotherapy is no longer needing any chemotherapy would like to have it removed

.  Procedure removal of Port-A-Cath was explained to the patient detail risk-

benefit were also discussed including bleeding infection alternatives to this 

procedure also discussed with the patient who seemed to understand and gave both

verbal and written consent to have the procedure performed.  Patient was taken 

to the minors room placed in the supine position area on his left chest was 

prepped and draped usual sterile fashion using ChloraPrep.  An area over the 

Port-A-Cath was injected with 1% lidocaine with epinephrine once this was 

anesthetized and incision was made with 15 blade scalpel this carried down 

through the subcutaneous tissues down to the Port-A-Cath the sutures for the 

Port-A-Cath were cut with Metzenbaum scissors once this was freed up the P

ort-A-Cath and catheter were removed and sent for pathology.  The wound was then

closed in a single layer 4-0 subcuticular Monocryl Mastisol Steri-Strips and 

island dressings were applied.  Patient tolerated procedure well was discharged 

home in stable condition all sponge instrument needle counts listed as correct 

estimated blood loss 10 mL











MARIA D PISANO MD              Oct 6, 2020 13:15

## 2020-10-08 NOTE — PATHOLOGY
[facility] Accession Number: [id number]

.                                                                01

Material submitted:                                        .

body - GROSS PORT A CATH

.                                                                01

Clinical history:                                          .

NO LONGER NEEDS PORT, REMOVAL

.                                                                02

**********************************************************************

Diagnosis:

Port-A-Cath (Gross only).

(JPM:noah; 10/08/2020)

MBR  10/08/2020  1736 Local

**********************************************************************

.                                                                02

Electronically signed:                                     .

Ajit Clark MD, Pathologist

NPI- [id number]

.                                                                01

Gross description:                                         .

The specimen is received fresh, labeled "Jeremiah Carrell, Port-A-Cath".

Received is a metal purple triangular port measuring 2.3 x 2.3 x 1.4 cm in

greatest dimensions with attached tubing measuring 25.3 cm in length

by 0.3 cm in diameter.  The back of the port is inscribed with "BARD" and

"[id number]".  Gross photographs are taken.  Sections are not submitted.

([facility]; 10/7/2020)

QAC/QAC  10/08/2020  1545 Local

.                                                                02

Pathologist provided ICD-10:

Z03.89

.                                                                02

CPT                                                        .

639162

Specimen Comment: A courtesy copy of this report has been sent to [phone], [phone]

Specimen Comment: [id number]

Specimen Comment: Report sent to  / DR BARFIELD

***Performed at:  01

   [facility]

   [address], [place]  [id number]

   MD Yonathan Wayne MD Phone:  [phone]

***Performed at:  02

   [facility]

   [address], [place]  [id number]

   MD Ajit Clark MD Phone:  [phone]

## 2022-05-19 ENCOUNTER — APPOINTMENT (RX ONLY)
Dept: URBAN - METROPOLITAN AREA CLINIC 76 | Facility: CLINIC | Age: 47
Setting detail: DERMATOLOGY
End: 2022-05-19

## 2022-05-19 DIAGNOSIS — L20.89 OTHER ATOPIC DERMATITIS: ICD-10-CM | Status: INADEQUATELY CONTROLLED

## 2022-05-19 DIAGNOSIS — L98.8 OTHER SPECIFIED DISORDERS OF THE SKIN AND SUBCUTANEOUS TISSUE: ICD-10-CM

## 2022-05-19 PROBLEM — L20.84 INTRINSIC (ALLERGIC) ECZEMA: Status: ACTIVE | Noted: 2022-05-19

## 2022-05-19 PROCEDURE — ? COUNSELING

## 2022-05-19 PROCEDURE — 99204 OFFICE O/P NEW MOD 45 MIN: CPT

## 2022-05-19 PROCEDURE — ? TREATMENT REGIMEN

## 2022-05-19 PROCEDURE — ? PRESCRIPTION

## 2022-05-19 RX ORDER — HYDROCORTISONE 25 MG/G
CREAM TOPICAL
Qty: 454 | Refills: 5 | Status: ERX | COMMUNITY
Start: 2022-05-19

## 2022-05-19 RX ORDER — FLUTICASONE PROPIONATE 0.5 MG/G
CREAM TOPICAL
Qty: 60 | Refills: 5 | Status: ERX | COMMUNITY
Start: 2022-05-19

## 2022-05-19 RX ADMIN — FLUTICASONE PROPIONATE: 0.5 CREAM TOPICAL at 00:00

## 2022-05-19 RX ADMIN — HYDROCORTISONE: 25 CREAM TOPICAL at 00:00

## 2022-05-19 ASSESSMENT — LOCATION ZONE DERM
LOCATION ZONE: AXILLAE
LOCATION ZONE: TRUNK
LOCATION ZONE: ARM
LOCATION ZONE: LEG

## 2022-05-19 ASSESSMENT — LOCATION SIMPLE DESCRIPTION DERM
LOCATION SIMPLE: RIGHT UPPER ARM
LOCATION SIMPLE: LEFT BUTTOCK
LOCATION SIMPLE: GROIN
LOCATION SIMPLE: LEFT UPPER ARM
LOCATION SIMPLE: RIGHT POSTERIOR AXILLA
LOCATION SIMPLE: RIGHT BUTTOCK
LOCATION SIMPLE: LEFT POPLITEAL SKIN
LOCATION SIMPLE: RIGHT POPLITEAL SKIN
LOCATION SIMPLE: RIGHT THIGH
LOCATION SIMPLE: LEFT THIGH
LOCATION SIMPLE: LEFT AXILLARY VAULT

## 2022-05-19 ASSESSMENT — LOCATION DETAILED DESCRIPTION DERM
LOCATION DETAILED: RIGHT POSTERIOR AXILLA
LOCATION DETAILED: RIGHT POPLITEAL SKIN
LOCATION DETAILED: LEFT ANTERIOR MEDIAL PROXIMAL THIGH
LOCATION DETAILED: LEFT POPLITEAL SKIN
LOCATION DETAILED: RIGHT ANTERIOR MEDIAL PROXIMAL THIGH
LOCATION DETAILED: LEFT MEDIAL BUTTOCK
LOCATION DETAILED: LEFT AXILLARY VAULT
LOCATION DETAILED: RIGHT ANTERIOR MEDIAL PROXIMAL UPPER ARM
LOCATION DETAILED: RIGHT MEDIAL BUTTOCK
LOCATION DETAILED: SUPRAPUBIC SKIN
LOCATION DETAILED: LEFT ANTERIOR MEDIAL PROXIMAL UPPER ARM

## 2022-05-19 ASSESSMENT — SEVERITY ASSESSMENT 2020: SEVERITY 2020: MODERATE

## 2022-05-19 NOTE — PROCEDURE: TREATMENT REGIMEN
Initiate Treatment: Hydrocortisone 2.5% to the eyelids and genitalia\\nFluticasone 0.05% cream to the backs of knees, abdomen, and buttocks
Detail Level: Simple
Otc Regimen: Robithol oil once daily in the shower

## 2022-05-19 NOTE — HPI: RASH
Is The Patient Presenting As Previously Scheduled?: No, they are a work-in
Is This A New Presentation, Or A Follow-Up?: Rash
Additional History: Patient had visited the urgent care twice. The first time he tried ketoconazole cream, it did not work. Then the second time they gave him terbinafine and nystatin mixed with triamcinilone. That worked very well and it almost cleared up all the way and then he ran out of medication and it came back.